# Patient Record
Sex: MALE | Race: WHITE | NOT HISPANIC OR LATINO | Employment: OTHER | URBAN - METROPOLITAN AREA
[De-identification: names, ages, dates, MRNs, and addresses within clinical notes are randomized per-mention and may not be internally consistent; named-entity substitution may affect disease eponyms.]

---

## 2023-05-23 LAB
ANION GAP IN SER/PLAS: 16 MMOL/L (ref 10–20)
CALCIUM (MG/DL) IN SER/PLAS: 10.7 MG/DL (ref 8.6–10.6)
CARBON DIOXIDE, TOTAL (MMOL/L) IN SER/PLAS: 29 MMOL/L (ref 21–32)
CHLORIDE (MMOL/L) IN SER/PLAS: 103 MMOL/L (ref 98–107)
CHOLESTEROL (MG/DL) IN SER/PLAS: 181 MG/DL (ref 0–199)
CHOLESTEROL IN HDL (MG/DL) IN SER/PLAS: 54.9 MG/DL
CHOLESTEROL/HDL RATIO: 3.3
CREATININE (MG/DL) IN SER/PLAS: 1.31 MG/DL (ref 0.5–1.3)
ESTIMATED AVERAGE GLUCOSE FOR HBA1C: 126 MG/DL
GFR MALE: 58 ML/MIN/1.73M2
GLUCOSE (MG/DL) IN SER/PLAS: 119 MG/DL (ref 74–99)
HEMOGLOBIN A1C/HEMOGLOBIN TOTAL IN BLOOD: 6 %
LDL: 85 MG/DL (ref 0–99)
NON HDL CHOLESTEROL: 126 MG/DL
POTASSIUM (MMOL/L) IN SER/PLAS: 5.6 MMOL/L (ref 3.5–5.3)
SODIUM (MMOL/L) IN SER/PLAS: 142 MMOL/L (ref 136–145)
TRIGLYCERIDE (MG/DL) IN SER/PLAS: 204 MG/DL (ref 0–149)
UREA NITROGEN (MG/DL) IN SER/PLAS: 24 MG/DL (ref 6–23)
VLDL: 41 MG/DL (ref 0–40)

## 2023-07-24 ENCOUNTER — OFFICE VISIT (OUTPATIENT)
Dept: PRIMARY CARE | Facility: CLINIC | Age: 73
End: 2023-07-24
Payer: MEDICARE

## 2023-07-24 VITALS — BODY MASS INDEX: 27.1 KG/M2 | SYSTOLIC BLOOD PRESSURE: 142 MMHG | WEIGHT: 173 LBS | DIASTOLIC BLOOD PRESSURE: 80 MMHG

## 2023-07-24 DIAGNOSIS — E11.9 TYPE 2 DIABETES MELLITUS WITHOUT COMPLICATION, WITHOUT LONG-TERM CURRENT USE OF INSULIN (MULTI): ICD-10-CM

## 2023-07-24 DIAGNOSIS — Z00.00 ROUTINE GENERAL MEDICAL EXAMINATION AT HEALTH CARE FACILITY: Primary | ICD-10-CM

## 2023-07-24 DIAGNOSIS — Z12.5 SCREENING FOR PROSTATE CANCER: ICD-10-CM

## 2023-07-24 DIAGNOSIS — I10 BENIGN ESSENTIAL HYPERTENSION: ICD-10-CM

## 2023-07-24 DIAGNOSIS — Z00.00 HEALTHCARE MAINTENANCE: ICD-10-CM

## 2023-07-24 DIAGNOSIS — Z13.1 DIABETES MELLITUS SCREENING: ICD-10-CM

## 2023-07-24 PROBLEM — G89.29 CHRONIC PAIN OF BOTH SHOULDERS: Status: ACTIVE | Noted: 2023-07-24

## 2023-07-24 PROBLEM — M25.511 CHRONIC PAIN OF BOTH SHOULDERS: Status: ACTIVE | Noted: 2023-07-24

## 2023-07-24 PROBLEM — I63.9 CVA (CEREBRAL VASCULAR ACCIDENT) (MULTI): Status: ACTIVE | Noted: 2023-07-24

## 2023-07-24 PROBLEM — M10.9 GOUT: Status: ACTIVE | Noted: 2023-07-24

## 2023-07-24 PROBLEM — I65.21 ATHEROSCLEROSIS OF RIGHT CAROTID ARTERY: Status: ACTIVE | Noted: 2023-07-24

## 2023-07-24 PROBLEM — E78.00 PURE HYPERCHOLESTEROLEMIA: Status: ACTIVE | Noted: 2023-07-24

## 2023-07-24 PROBLEM — M25.512 CHRONIC PAIN OF BOTH SHOULDERS: Status: ACTIVE | Noted: 2023-07-24

## 2023-07-24 PROCEDURE — 3077F SYST BP >= 140 MM HG: CPT | Performed by: STUDENT IN AN ORGANIZED HEALTH CARE EDUCATION/TRAINING PROGRAM

## 2023-07-24 PROCEDURE — G0439 PPPS, SUBSEQ VISIT: HCPCS | Performed by: STUDENT IN AN ORGANIZED HEALTH CARE EDUCATION/TRAINING PROGRAM

## 2023-07-24 PROCEDURE — 4010F ACE/ARB THERAPY RXD/TAKEN: CPT | Performed by: STUDENT IN AN ORGANIZED HEALTH CARE EDUCATION/TRAINING PROGRAM

## 2023-07-24 PROCEDURE — 3079F DIAST BP 80-89 MM HG: CPT | Performed by: STUDENT IN AN ORGANIZED HEALTH CARE EDUCATION/TRAINING PROGRAM

## 2023-07-24 PROCEDURE — 1170F FXNL STATUS ASSESSED: CPT | Performed by: STUDENT IN AN ORGANIZED HEALTH CARE EDUCATION/TRAINING PROGRAM

## 2023-07-24 PROCEDURE — 90732 PPSV23 VACC 2 YRS+ SUBQ/IM: CPT | Performed by: STUDENT IN AN ORGANIZED HEALTH CARE EDUCATION/TRAINING PROGRAM

## 2023-07-24 PROCEDURE — 1160F RVW MEDS BY RX/DR IN RCRD: CPT | Performed by: STUDENT IN AN ORGANIZED HEALTH CARE EDUCATION/TRAINING PROGRAM

## 2023-07-24 PROCEDURE — G0009 ADMIN PNEUMOCOCCAL VACCINE: HCPCS | Performed by: STUDENT IN AN ORGANIZED HEALTH CARE EDUCATION/TRAINING PROGRAM

## 2023-07-24 PROCEDURE — 99204 OFFICE O/P NEW MOD 45 MIN: CPT | Performed by: STUDENT IN AN ORGANIZED HEALTH CARE EDUCATION/TRAINING PROGRAM

## 2023-07-24 PROCEDURE — 3044F HG A1C LEVEL LT 7.0%: CPT | Performed by: STUDENT IN AN ORGANIZED HEALTH CARE EDUCATION/TRAINING PROGRAM

## 2023-07-24 PROCEDURE — 1036F TOBACCO NON-USER: CPT | Performed by: STUDENT IN AN ORGANIZED HEALTH CARE EDUCATION/TRAINING PROGRAM

## 2023-07-24 PROCEDURE — 1159F MED LIST DOCD IN RCRD: CPT | Performed by: STUDENT IN AN ORGANIZED HEALTH CARE EDUCATION/TRAINING PROGRAM

## 2023-07-24 PROCEDURE — 2028F FOOT EXAM PERFORMED: CPT | Performed by: STUDENT IN AN ORGANIZED HEALTH CARE EDUCATION/TRAINING PROGRAM

## 2023-07-24 RX ORDER — LISINOPRIL 40 MG/1
40 TABLET ORAL DAILY
Qty: 90 TABLET | Refills: 1 | Status: SHIPPED | OUTPATIENT
Start: 2023-07-24 | End: 2024-01-08

## 2023-07-24 RX ORDER — LISINOPRIL 20 MG/1
20 TABLET ORAL DAILY
COMMUNITY
End: 2023-07-24 | Stop reason: SDUPTHER

## 2023-07-24 RX ORDER — ATORVASTATIN CALCIUM 40 MG/1
40 TABLET, FILM COATED ORAL DAILY
COMMUNITY
End: 2024-02-08 | Stop reason: SDUPTHER

## 2023-07-24 RX ORDER — AMLODIPINE BESYLATE 5 MG/1
5 TABLET ORAL DAILY
COMMUNITY
End: 2024-02-06 | Stop reason: SDUPTHER

## 2023-07-24 RX ORDER — BLOOD-GLUCOSE METER
1 EACH MISCELLANEOUS DAILY
COMMUNITY
End: 2024-02-29 | Stop reason: SDUPTHER

## 2023-07-24 RX ORDER — NEBIVOLOL 10 MG/1
10 TABLET ORAL DAILY
COMMUNITY
Start: 2023-07-14 | End: 2024-01-15 | Stop reason: SDUPTHER

## 2023-07-24 RX ORDER — TELMISARTAN 40 MG/1
TABLET ORAL
COMMUNITY
Start: 2023-07-17 | End: 2023-07-24

## 2023-07-24 RX ORDER — INSULIN GLARGINE 300 U/ML
INJECTION, SOLUTION SUBCUTANEOUS
COMMUNITY
End: 2024-03-12 | Stop reason: SDUPTHER

## 2023-07-24 RX ORDER — GLIPIZIDE 5 MG/1
5 TABLET, FILM COATED, EXTENDED RELEASE ORAL DAILY
COMMUNITY
End: 2023-09-14 | Stop reason: SDUPTHER

## 2023-07-24 RX ORDER — METFORMIN HYDROCHLORIDE 1000 MG/1
1000 TABLET ORAL 2 TIMES DAILY
COMMUNITY
End: 2023-07-31 | Stop reason: SDUPTHER

## 2023-07-24 RX ORDER — NAPROXEN SODIUM 220 MG/1
TABLET, FILM COATED ORAL
COMMUNITY
Start: 2017-08-24

## 2023-07-24 RX ORDER — ATENOLOL 50 MG/1
50 TABLET ORAL DAILY
COMMUNITY
End: 2024-04-15 | Stop reason: ALTCHOICE

## 2023-07-24 ASSESSMENT — ACTIVITIES OF DAILY LIVING (ADL)
BATHING: INDEPENDENT
DOING_HOUSEWORK: INDEPENDENT
MANAGING_FINANCES: INDEPENDENT
TAKING_MEDICATION: INDEPENDENT
GROCERY_SHOPPING: INDEPENDENT
DRESSING: INDEPENDENT

## 2023-07-24 ASSESSMENT — ENCOUNTER SYMPTOMS
ABDOMINAL PAIN: 0
JOINT SWELLING: 0
FREQUENCY: 0
WHEEZING: 0
CHILLS: 0
SORE THROAT: 0
NECK PAIN: 0
HEADACHES: 0
VOMITING: 0
EYE PAIN: 0
PALPITATIONS: 0
DYSURIA: 0
FEVER: 0
WEAKNESS: 1
NUMBNESS: 1
SHORTNESS OF BREATH: 0

## 2023-07-24 ASSESSMENT — PATIENT HEALTH QUESTIONNAIRE - PHQ9
1. LITTLE INTEREST OR PLEASURE IN DOING THINGS: NOT AT ALL
SUM OF ALL RESPONSES TO PHQ9 QUESTIONS 1 AND 2: 0
2. FEELING DOWN, DEPRESSED OR HOPELESS: NOT AT ALL

## 2023-07-24 NOTE — PROGRESS NOTES
Subjective   Reason for Visit: Harsha Herring is an 72 y.o. male here for a Medicare Wellness visit.          Reviewed all medications by prescribing practitioner or clinical pharmacist (such as prescriptions, OTCs, herbal therapies and supplements) and documented in the medical record.    HPI    Patient Care Team:  Michael Frazier DO as PCP - General     Review of Systems    Objective   Vitals:  /80   Wt 78.5 kg (173 lb)   BMI 27.10 kg/m²       Physical Exam    Assessment/Plan   Problem List Items Addressed This Visit    None

## 2023-07-24 NOTE — PROGRESS NOTES
Subjective   Reason for Visit: Harsha Herring is an 72 y.o. male here for a Medicare Wellness visit.     Past Medical, Surgical, and Family History reviewed and updated in chart.    Reviewed all medications by prescribing practitioner or clinical pharmacist (such as prescriptions, OTCs, herbal therapies and supplements) and documented in the medical record.    Patient presents to establish PCP due to prior PCP retiring. He has no acute concerns. He occasionally has numbness of his right thumb. He mentions his blood sugars at home are typically 140-200. No hypoglycemia episodes. No falls. He eats a low carb diet and is active.     PMH: diabetes, HTN, HLD, gout, CVA with residual left sided weakness  PSH: hernia repair  SH: never smoker, drinks 6 beers three times a week, no drugs. Retired.   FH: bone cancer (mom)    Patient Care Team:  Michael Frazier DO as PCP - General     Review of Systems   Constitutional:  Negative for chills and fever.   HENT:  Negative for sore throat.    Eyes:  Negative for pain.   Respiratory:  Negative for shortness of breath and wheezing.    Cardiovascular:  Negative for chest pain and palpitations.   Gastrointestinal:  Negative for abdominal pain and vomiting.   Genitourinary:  Negative for dysuria and frequency.   Musculoskeletal:  Negative for joint swelling and neck pain.   Skin:  Negative for rash.   Neurological:  Positive for weakness (residual after prior stroke) and numbness (right thumb). Negative for headaches.   All other systems reviewed and are negative.      Objective   Vitals:  /80   Wt 78.5 kg (173 lb)   BMI 27.10 kg/m²       Physical Exam  Vitals and nursing note reviewed.   Constitutional:       General: He is not in acute distress.     Appearance: Normal appearance. He is not ill-appearing or toxic-appearing.   HENT:      Head: Normocephalic and atraumatic.      Right Ear: External ear normal.      Left Ear: External ear normal.      Mouth/Throat:       Mouth: Mucous membranes are moist.   Eyes:      Conjunctiva/sclera: Conjunctivae normal.   Cardiovascular:      Rate and Rhythm: Normal rate and regular rhythm.      Heart sounds: Normal heart sounds.   Pulmonary:      Effort: Pulmonary effort is normal.      Breath sounds: Normal breath sounds.   Abdominal:      General: There is no distension.      Palpations: Abdomen is soft.      Tenderness: There is no abdominal tenderness. There is no guarding.   Musculoskeletal:      Cervical back: Neck supple.      Right lower leg: No edema.      Left lower leg: No edema.   Skin:     General: Skin is warm and dry.   Neurological:      Mental Status: He is alert and oriented to person, place, and time.      Sensory: No sensory deficit.   Psychiatric:         Behavior: Behavior normal.         Assessment/Plan   Problem List Items Addressed This Visit       Type 2 diabetes mellitus (CMS/[facility])    Relevant Orders    Hemoglobin A1C     Other Visit Diagnoses       Routine general medical examination at health care facility    -  Primary    Diabetes mellitus screening        Relevant Orders    Hemoglobin A1C    Comprehensive Metabolic Panel    Screening for prostate cancer        Relevant Orders    Prostate Specific Antigen, Screen          #HTN  -Continue amlodipine, lisinopril, B-blocker (unsure if he is on atenolol or nebivolol). He has not received telmisartan yet, discussed he can just continue current lisinopril and not do ARB since unknown why it was going to be switched.    #HLD  -Continue atorvastatin    #T2DM  -A1c in May 2023 6%. Continue toujeo 10 units daily, glipizide, metformin. Repeat A1c next month. Consider de-escalation of regimen at next appointment.  -Recommend annual eye and foot exam.    #Hyperkalemia  #Elevated creatinine  -Noted on labs in May 2023, will repeat CMP. If persists, may need to stop lisinopril.     #History of CVA   -Continue aspirin, statin    #Health maintenance  -Reports he had negative  davi 2022.   -Pneumonia vaccine given today. Advised shingles vaccine.  -Order PSA  -Routine labs     RTC in 3-4 months    Pt seen and examined, Increase lisinopril due to elevated BP, continue other medicaitons, FU in 3-4 months

## 2023-07-27 DIAGNOSIS — I10 BENIGN ESSENTIAL HYPERTENSION: ICD-10-CM

## 2023-07-28 RX ORDER — ATORVASTATIN CALCIUM 40 MG/1
40 TABLET, FILM COATED ORAL DAILY
Refills: 0 | OUTPATIENT
Start: 2023-07-28

## 2023-07-28 RX ORDER — ATENOLOL 50 MG/1
50 TABLET ORAL DAILY
Refills: 0 | OUTPATIENT
Start: 2023-07-28

## 2023-07-28 RX ORDER — AMLODIPINE BESYLATE 5 MG/1
5 TABLET ORAL DAILY
Refills: 0 | OUTPATIENT
Start: 2023-07-28

## 2023-07-28 RX ORDER — METFORMIN HYDROCHLORIDE 1000 MG/1
1000 TABLET ORAL 2 TIMES DAILY
Refills: 0 | OUTPATIENT
Start: 2023-07-28

## 2023-07-28 RX ORDER — GLIPIZIDE 5 MG/1
5 TABLET, FILM COATED, EXTENDED RELEASE ORAL DAILY
Qty: 90 TABLET | Refills: 0 | OUTPATIENT
Start: 2023-07-28 | End: 2023-10-26

## 2023-08-01 RX ORDER — METFORMIN HYDROCHLORIDE 1000 MG/1
1000 TABLET ORAL 2 TIMES DAILY
Qty: 180 TABLET | Refills: 1 | Status: SHIPPED | OUTPATIENT
Start: 2023-08-01 | End: 2024-03-12 | Stop reason: SDUPTHER

## 2023-08-03 RX ORDER — AMLODIPINE BESYLATE 5 MG/1
5 TABLET ORAL DAILY
Qty: 90 TABLET | Refills: 1 | OUTPATIENT
Start: 2023-08-03

## 2023-08-03 RX ORDER — GLIPIZIDE 5 MG/1
5 TABLET, FILM COATED, EXTENDED RELEASE ORAL DAILY
Qty: 90 TABLET | Refills: 1 | OUTPATIENT
Start: 2023-08-03

## 2023-08-03 NOTE — TELEPHONE ENCOUNTER
Patients medications refill have been denied as patient has not been seen in office during appropriate timeframe, please call patient in order to set up appropriate follow up

## 2023-09-14 ENCOUNTER — APPOINTMENT (OUTPATIENT)
Dept: PRIMARY CARE | Facility: CLINIC | Age: 73
End: 2023-09-14
Payer: MEDICARE

## 2023-09-14 DIAGNOSIS — E11.9 TYPE 2 DIABETES MELLITUS WITHOUT COMPLICATION, UNSPECIFIED WHETHER LONG TERM INSULIN USE (MULTI): Primary | ICD-10-CM

## 2023-09-14 RX ORDER — GLIPIZIDE 5 MG/1
5 TABLET, FILM COATED, EXTENDED RELEASE ORAL DAILY
Qty: 90 TABLET | Refills: 0 | Status: SHIPPED | OUTPATIENT
Start: 2023-09-14 | End: 2023-12-13

## 2023-10-10 ENCOUNTER — LAB (OUTPATIENT)
Dept: LAB | Facility: LAB | Age: 73
End: 2023-10-10
Payer: MEDICARE

## 2023-10-10 DIAGNOSIS — E11.9 TYPE 2 DIABETES MELLITUS WITHOUT COMPLICATION, WITHOUT LONG-TERM CURRENT USE OF INSULIN (MULTI): ICD-10-CM

## 2023-10-10 DIAGNOSIS — Z12.5 SCREENING FOR PROSTATE CANCER: ICD-10-CM

## 2023-10-10 DIAGNOSIS — Z13.1 DIABETES MELLITUS SCREENING: ICD-10-CM

## 2023-10-10 LAB
ALBUMIN SERPL BCP-MCNC: 4.2 G/DL (ref 3.4–5)
ALP SERPL-CCNC: 81 U/L (ref 33–136)
ALT SERPL W P-5'-P-CCNC: 26 U/L (ref 10–52)
ANION GAP SERPL CALC-SCNC: 17 MMOL/L (ref 10–20)
AST SERPL W P-5'-P-CCNC: 15 U/L (ref 9–39)
BILIRUB SERPL-MCNC: 0.4 MG/DL (ref 0–1.2)
BUN SERPL-MCNC: 22 MG/DL (ref 6–23)
CALCIUM SERPL-MCNC: 10.3 MG/DL (ref 8.6–10.6)
CHLORIDE SERPL-SCNC: 103 MMOL/L (ref 98–107)
CO2 SERPL-SCNC: 26 MMOL/L (ref 21–32)
CREAT SERPL-MCNC: 1.48 MG/DL (ref 0.5–1.3)
EST. AVERAGE GLUCOSE BLD GHB EST-MCNC: 169 MG/DL
GFR SERPL CREATININE-BSD FRML MDRD: 50 ML/MIN/1.73M*2
GLUCOSE SERPL-MCNC: 246 MG/DL (ref 74–99)
HBA1C MFR BLD: 7.5 %
POTASSIUM SERPL-SCNC: 5.5 MMOL/L (ref 3.5–5.3)
PROT SERPL-MCNC: 6.8 G/DL (ref 6.4–8.2)
PSA SERPL-MCNC: 0.79 NG/ML
SODIUM SERPL-SCNC: 140 MMOL/L (ref 136–145)

## 2023-10-10 PROCEDURE — 36415 COLL VENOUS BLD VENIPUNCTURE: CPT

## 2023-10-10 PROCEDURE — G0103 PSA SCREENING: HCPCS

## 2023-10-10 PROCEDURE — 80053 COMPREHEN METABOLIC PANEL: CPT

## 2023-10-10 PROCEDURE — 83036 HEMOGLOBIN GLYCOSYLATED A1C: CPT

## 2023-10-31 ENCOUNTER — LAB (OUTPATIENT)
Dept: LAB | Facility: LAB | Age: 73
End: 2023-10-31
Payer: MEDICARE

## 2023-10-31 ENCOUNTER — OFFICE VISIT (OUTPATIENT)
Dept: PRIMARY CARE | Facility: CLINIC | Age: 73
End: 2023-10-31
Payer: MEDICARE

## 2023-10-31 VITALS
SYSTOLIC BLOOD PRESSURE: 128 MMHG | HEIGHT: 67 IN | DIASTOLIC BLOOD PRESSURE: 70 MMHG | HEART RATE: 64 BPM | OXYGEN SATURATION: 99 % | BODY MASS INDEX: 27.15 KG/M2 | WEIGHT: 173 LBS

## 2023-10-31 DIAGNOSIS — R73.9 HYPERGLYCEMIA: Primary | ICD-10-CM

## 2023-10-31 DIAGNOSIS — R73.9 HYPERGLYCEMIA: ICD-10-CM

## 2023-10-31 PROBLEM — I63.9 ACUTE CEREBROVASCULAR ACCIDENT (CVA) (MULTI): Status: RESOLVED | Noted: 2018-08-13 | Resolved: 2023-10-31

## 2023-10-31 PROBLEM — K40.90 INGUINAL HERNIA: Status: RESOLVED | Noted: 2023-10-31 | Resolved: 2023-10-31

## 2023-10-31 LAB
ANION GAP SERPL CALC-SCNC: 14 MMOL/L (ref 10–20)
BUN SERPL-MCNC: 28 MG/DL (ref 6–23)
CALCIUM SERPL-MCNC: 10.4 MG/DL (ref 8.6–10.6)
CHLORIDE SERPL-SCNC: 105 MMOL/L (ref 98–107)
CO2 SERPL-SCNC: 26 MMOL/L (ref 21–32)
CREAT SERPL-MCNC: 1.3 MG/DL (ref 0.5–1.3)
GFR SERPL CREATININE-BSD FRML MDRD: 58 ML/MIN/1.73M*2
GLUCOSE SERPL-MCNC: 164 MG/DL (ref 74–99)
POTASSIUM SERPL-SCNC: 4.4 MMOL/L (ref 3.5–5.3)
SODIUM SERPL-SCNC: 141 MMOL/L (ref 136–145)

## 2023-10-31 PROCEDURE — 36415 COLL VENOUS BLD VENIPUNCTURE: CPT

## 2023-10-31 PROCEDURE — 3074F SYST BP LT 130 MM HG: CPT | Performed by: STUDENT IN AN ORGANIZED HEALTH CARE EDUCATION/TRAINING PROGRAM

## 2023-10-31 PROCEDURE — 1159F MED LIST DOCD IN RCRD: CPT | Performed by: STUDENT IN AN ORGANIZED HEALTH CARE EDUCATION/TRAINING PROGRAM

## 2023-10-31 PROCEDURE — 80048 BASIC METABOLIC PNL TOTAL CA: CPT

## 2023-10-31 PROCEDURE — 4010F ACE/ARB THERAPY RXD/TAKEN: CPT | Performed by: STUDENT IN AN ORGANIZED HEALTH CARE EDUCATION/TRAINING PROGRAM

## 2023-10-31 PROCEDURE — 3051F HG A1C>EQUAL 7.0%<8.0%: CPT | Performed by: STUDENT IN AN ORGANIZED HEALTH CARE EDUCATION/TRAINING PROGRAM

## 2023-10-31 PROCEDURE — 1160F RVW MEDS BY RX/DR IN RCRD: CPT | Performed by: STUDENT IN AN ORGANIZED HEALTH CARE EDUCATION/TRAINING PROGRAM

## 2023-10-31 PROCEDURE — 3078F DIAST BP <80 MM HG: CPT | Performed by: STUDENT IN AN ORGANIZED HEALTH CARE EDUCATION/TRAINING PROGRAM

## 2023-10-31 PROCEDURE — 1036F TOBACCO NON-USER: CPT | Performed by: STUDENT IN AN ORGANIZED HEALTH CARE EDUCATION/TRAINING PROGRAM

## 2023-10-31 PROCEDURE — 2028F FOOT EXAM PERFORMED: CPT | Performed by: STUDENT IN AN ORGANIZED HEALTH CARE EDUCATION/TRAINING PROGRAM

## 2023-10-31 PROCEDURE — 99214 OFFICE O/P EST MOD 30 MIN: CPT | Performed by: STUDENT IN AN ORGANIZED HEALTH CARE EDUCATION/TRAINING PROGRAM

## 2023-10-31 ASSESSMENT — ENCOUNTER SYMPTOMS
SHORTNESS OF BREATH: 0
JOINT SWELLING: 0
DYSURIA: 0
FREQUENCY: 0
PALPITATIONS: 0
CHILLS: 0
HEADACHES: 0
WEAKNESS: 1
ABDOMINAL PAIN: 0
NUMBNESS: 1
NECK PAIN: 0
VOMITING: 0
WHEEZING: 0
EYE PAIN: 0
FEVER: 0
SORE THROAT: 0

## 2023-10-31 NOTE — PROGRESS NOTES
"Subjective   Reason for Visit: Harsha Herring is an 73 y.o. male here for a Medicare Wellness visit.     Past Medical, Surgical, and Family History reviewed and updated in chart.    Reviewed all medications by prescribing practitioner or clinical pharmacist (such as prescriptions, OTCs, herbal therapies and supplements) and documented in the medical record.    Patient presents as a follow-up. No acute concerns. States that his sugars are in the 120s-140s at home. No hypoglycemic episodes. No falls. Still eats a low carb diet and is active. No dizziness/LH/headaches. Does not take his BP at home.    Patient Care Team:  Michael Frazier DO as PCP - General  Ihsan Villarreal MD as PCP - [facility]     Review of Systems   Constitutional:  Negative for chills and fever.   HENT:  Negative for sore throat.    Eyes:  Negative for pain.   Respiratory:  Negative for shortness of breath and wheezing.    Cardiovascular:  Negative for chest pain and palpitations.   Gastrointestinal:  Negative for abdominal pain and vomiting.   Genitourinary:  Negative for dysuria and frequency.   Musculoskeletal:  Negative for joint swelling and neck pain.   Skin:  Negative for rash.   Neurological:  Positive for weakness (residual after prior stroke) and numbness (right thumb). Negative for headaches.   All other systems reviewed and are negative.      Objective   Vitals:  /70   Pulse 64   Ht 1.702 m (5' 7\")   Wt 78.5 kg (173 lb)   SpO2 99%   BMI 27.10 kg/m²       Physical Exam  Vitals and nursing note reviewed.   Constitutional:       General: He is not in acute distress.     Appearance: Normal appearance. He is not ill-appearing or toxic-appearing.   HENT:      Head: Normocephalic and atraumatic.      Right Ear: External ear normal.      Left Ear: External ear normal.      Mouth/Throat:      Mouth: Mucous membranes are moist.   Eyes:      Conjunctiva/sclera: Conjunctivae normal.   Cardiovascular:      Rate and " Rhythm: Normal rate and regular rhythm.      Heart sounds: Normal heart sounds.   Pulmonary:      Effort: Pulmonary effort is normal.      Breath sounds: Normal breath sounds.   Abdominal:      General: There is no distension.      Palpations: Abdomen is soft.      Tenderness: There is no abdominal tenderness. There is no guarding.   Musculoskeletal:      Cervical back: Neck supple.      Right lower leg: No edema.      Left lower leg: No edema.   Skin:     General: Skin is warm and dry.   Neurological:      Mental Status: He is alert and oriented to person, place, and time.      Sensory: No sensory deficit.   Psychiatric:         Behavior: Behavior normal.         Assessment/Plan   Problem List Items Addressed This Visit    None  Visit Diagnoses       Hyperglycemia    -  Primary    Relevant Orders    Basic metabolic panel          #HTN  ::BP on retake improved  -Continue amlodipine, lisinopril, B-blocker.    #HLD  -Continue atorvastatin    #T2DM  -A1c in October 2023 7.5%, in May 2023 6%. Continue toujeo 10 units daily, glipizide, metformin.   -ordered BMP, if glucose still elevated will increase glipizide  -Recommend annual eye and foot exam.    #Hyperkalemia  #Elevated creatinine  -K+ 5.5 Cr 1.48: if persists, may need to stop lisinopril/start a binder    #History of CVA   -Continue aspirin, statin    #Health maintenance  ::PSA 10/10/23: .79   -Reports negative cologuard 2022.   -Up to date on pneumonia vaccine  -does not want flu/covid/shingrex at this time    RTC in 3-4 months    Luz Velazquez MD  PGY-1 Internal Medicine  Staffed with the attending physician Dr. Frazier.         Patient seen with resident physician, recheck of blood pressure noted to be within goal.  Encourage diet and lifestyle modifications.  He reports that his blood sugars have been much more controlled at home.  A1c was elevated though.  Recheck BMP today to follow-up on potassium as well as blood sugars.  If continues to be elevated,  will increase glipizide to 10 mg.  Advised follow-up in 3 to 4 months

## 2024-02-06 ENCOUNTER — LAB (OUTPATIENT)
Dept: LAB | Facility: LAB | Age: 74
End: 2024-02-06
Payer: COMMERCIAL

## 2024-02-06 ENCOUNTER — OFFICE VISIT (OUTPATIENT)
Dept: PRIMARY CARE | Facility: CLINIC | Age: 74
End: 2024-02-06
Payer: COMMERCIAL

## 2024-02-06 VITALS
BODY MASS INDEX: 27 KG/M2 | DIASTOLIC BLOOD PRESSURE: 80 MMHG | HEIGHT: 67 IN | SYSTOLIC BLOOD PRESSURE: 150 MMHG | WEIGHT: 172 LBS

## 2024-02-06 DIAGNOSIS — Z13.1 DIABETES MELLITUS SCREENING: ICD-10-CM

## 2024-02-06 DIAGNOSIS — E11.9 TYPE 2 DIABETES MELLITUS WITHOUT COMPLICATION, WITHOUT LONG-TERM CURRENT USE OF INSULIN (MULTI): ICD-10-CM

## 2024-02-06 DIAGNOSIS — E08.22 DIABETES MELLITUS DUE TO UNDERLYING CONDITION WITH CHRONIC KIDNEY DISEASE, WITHOUT LONG-TERM CURRENT USE OF INSULIN, UNSPECIFIED CKD STAGE (MULTI): ICD-10-CM

## 2024-02-06 DIAGNOSIS — Z00.00 ROUTINE GENERAL MEDICAL EXAMINATION AT HEALTH CARE FACILITY: Primary | ICD-10-CM

## 2024-02-06 DIAGNOSIS — I63.12: ICD-10-CM

## 2024-02-06 DIAGNOSIS — Z13.6 SCREENING FOR CARDIOVASCULAR CONDITION: ICD-10-CM

## 2024-02-06 DIAGNOSIS — I63.9 CEREBROVASCULAR ACCIDENT (CVA), UNSPECIFIED MECHANISM (MULTI): ICD-10-CM

## 2024-02-06 DIAGNOSIS — I10 BENIGN ESSENTIAL HYPERTENSION: ICD-10-CM

## 2024-02-06 DIAGNOSIS — R93.89 ABNORMAL CAROTID ULTRASOUND: ICD-10-CM

## 2024-02-06 LAB
ALBUMIN SERPL BCP-MCNC: 4.3 G/DL (ref 3.4–5)
ALP SERPL-CCNC: 79 U/L (ref 33–136)
ALT SERPL W P-5'-P-CCNC: 15 U/L (ref 10–52)
ANION GAP SERPL CALC-SCNC: 16 MMOL/L (ref 10–20)
AST SERPL W P-5'-P-CCNC: 13 U/L (ref 9–39)
BILIRUB SERPL-MCNC: 0.5 MG/DL (ref 0–1.2)
BUN SERPL-MCNC: 26 MG/DL (ref 6–23)
CALCIUM SERPL-MCNC: 10.1 MG/DL (ref 8.6–10.6)
CHLORIDE SERPL-SCNC: 102 MMOL/L (ref 98–107)
CHOLEST SERPL-MCNC: 278 MG/DL (ref 0–199)
CHOLESTEROL/HDL RATIO: 5.7
CO2 SERPL-SCNC: 28 MMOL/L (ref 21–32)
CREAT SERPL-MCNC: 1.32 MG/DL (ref 0.5–1.3)
CREAT UR-MCNC: 74.4 MG/DL (ref 20–370)
EGFRCR SERPLBLD CKD-EPI 2021: 57 ML/MIN/1.73M*2
EST. AVERAGE GLUCOSE BLD GHB EST-MCNC: 134 MG/DL
GLUCOSE SERPL-MCNC: 150 MG/DL (ref 74–99)
HBA1C MFR BLD: 6.3 %
HDLC SERPL-MCNC: 49.2 MG/DL
LDLC SERPL CALC-MCNC: 172 MG/DL
MICROALBUMIN UR-MCNC: 1165.4 MG/L
MICROALBUMIN/CREAT UR: 1566.4 UG/MG CREAT
NON HDL CHOLESTEROL: 229 MG/DL (ref 0–149)
POTASSIUM SERPL-SCNC: 4.5 MMOL/L (ref 3.5–5.3)
PROT SERPL-MCNC: 6.7 G/DL (ref 6.4–8.2)
SODIUM SERPL-SCNC: 141 MMOL/L (ref 136–145)
TRIGL SERPL-MCNC: 282 MG/DL (ref 0–149)
VLDL: 56 MG/DL (ref 0–40)

## 2024-02-06 PROCEDURE — 99214 OFFICE O/P EST MOD 30 MIN: CPT | Performed by: STUDENT IN AN ORGANIZED HEALTH CARE EDUCATION/TRAINING PROGRAM

## 2024-02-06 PROCEDURE — 1036F TOBACCO NON-USER: CPT | Performed by: STUDENT IN AN ORGANIZED HEALTH CARE EDUCATION/TRAINING PROGRAM

## 2024-02-06 PROCEDURE — 4010F ACE/ARB THERAPY RXD/TAKEN: CPT | Performed by: STUDENT IN AN ORGANIZED HEALTH CARE EDUCATION/TRAINING PROGRAM

## 2024-02-06 PROCEDURE — 3079F DIAST BP 80-89 MM HG: CPT | Performed by: STUDENT IN AN ORGANIZED HEALTH CARE EDUCATION/TRAINING PROGRAM

## 2024-02-06 PROCEDURE — 82043 UR ALBUMIN QUANTITATIVE: CPT

## 2024-02-06 PROCEDURE — G0446 INTENS BEHAVE THER CARDIO DX: HCPCS | Performed by: STUDENT IN AN ORGANIZED HEALTH CARE EDUCATION/TRAINING PROGRAM

## 2024-02-06 PROCEDURE — 1159F MED LIST DOCD IN RCRD: CPT | Performed by: STUDENT IN AN ORGANIZED HEALTH CARE EDUCATION/TRAINING PROGRAM

## 2024-02-06 PROCEDURE — 80061 LIPID PANEL: CPT

## 2024-02-06 PROCEDURE — 3077F SYST BP >= 140 MM HG: CPT | Performed by: STUDENT IN AN ORGANIZED HEALTH CARE EDUCATION/TRAINING PROGRAM

## 2024-02-06 PROCEDURE — 36415 COLL VENOUS BLD VENIPUNCTURE: CPT

## 2024-02-06 PROCEDURE — 1160F RVW MEDS BY RX/DR IN RCRD: CPT | Performed by: STUDENT IN AN ORGANIZED HEALTH CARE EDUCATION/TRAINING PROGRAM

## 2024-02-06 PROCEDURE — 82570 ASSAY OF URINE CREATININE: CPT

## 2024-02-06 PROCEDURE — 1170F FXNL STATUS ASSESSED: CPT | Performed by: STUDENT IN AN ORGANIZED HEALTH CARE EDUCATION/TRAINING PROGRAM

## 2024-02-06 PROCEDURE — 80053 COMPREHEN METABOLIC PANEL: CPT

## 2024-02-06 PROCEDURE — 83036 HEMOGLOBIN GLYCOSYLATED A1C: CPT

## 2024-02-06 PROCEDURE — G0444 DEPRESSION SCREEN ANNUAL: HCPCS | Performed by: STUDENT IN AN ORGANIZED HEALTH CARE EDUCATION/TRAINING PROGRAM

## 2024-02-06 PROCEDURE — G0439 PPPS, SUBSEQ VISIT: HCPCS | Performed by: STUDENT IN AN ORGANIZED HEALTH CARE EDUCATION/TRAINING PROGRAM

## 2024-02-06 RX ORDER — LISINOPRIL 40 MG/1
40 TABLET ORAL DAILY
Qty: 90 TABLET | Refills: 1 | Status: SHIPPED | OUTPATIENT
Start: 2024-02-06 | End: 2024-08-04

## 2024-02-06 RX ORDER — NEBIVOLOL 20 MG/1
20 TABLET ORAL DAILY
Qty: 90 TABLET | Refills: 1 | Status: SHIPPED | OUTPATIENT
Start: 2024-02-06 | End: 2024-08-04

## 2024-02-06 RX ORDER — AMLODIPINE BESYLATE 5 MG/1
5 TABLET ORAL DAILY
Qty: 90 TABLET | Refills: 1 | Status: SHIPPED | OUTPATIENT
Start: 2024-02-06 | End: 2024-05-07 | Stop reason: SDUPTHER

## 2024-02-06 ASSESSMENT — ENCOUNTER SYMPTOMS
DIZZINESS: 0
NERVOUS/ANXIOUS: 0
FATIGUE: 1
BLACKOUTS: 0
HEADACHES: 0
CONFUSION: 0
POLYDIPSIA: 0
HUNGER: 0
VISUAL CHANGE: 0
BLURRED VISION: 0
CARDIOVASCULAR NEGATIVE: 1
RESPIRATORY NEGATIVE: 1
TREMORS: 0
PSYCHIATRIC NEGATIVE: 1
OCCASIONAL FEELINGS OF UNSTEADINESS: 0
POLYPHAGIA: 0
SWEATS: 0
SEIZURES: 0
WEIGHT LOSS: 0
MUSCULOSKELETAL NEGATIVE: 1
NEUROLOGICAL NEGATIVE: 1
GASTROINTESTINAL NEGATIVE: 1
LOSS OF SENSATION IN FEET: 0
SPEECH DIFFICULTY: 0
DEPRESSION: 0
WEAKNESS: 0

## 2024-02-06 ASSESSMENT — ACTIVITIES OF DAILY LIVING (ADL)
GROCERY_SHOPPING: INDEPENDENT
TAKING_MEDICATION: INDEPENDENT
DOING_HOUSEWORK: INDEPENDENT
BATHING: INDEPENDENT
DRESSING: INDEPENDENT
MANAGING_FINANCES: INDEPENDENT

## 2024-02-06 NOTE — PROGRESS NOTES
Subjective   Patient ID: Harsha Herring is a 73 y.o. male who presents for Follow-up (For diabetes), Med Refill (Lisinopril; Test strips; Toujeo), and Medicare Annual Wellness Visit Subsequent.  HPI  Patient is here today for follow up. Reports feeling well and has no new complaints. He reports his SBP ranges around 140-150 and this morning it was about 203 SBP. Reports his sugars are well controlled in the same range as before.     Review of Systems  12-point ROS was reviewed and is negative, unless otherwise noted in HPI    Objective   Vitals:    02/06/24 0953   BP: 150/80      Physical Exam  GEN: alert, conversant, NAD  HEENT: PERRL, EOMI, MMM  NECK: supple, no LAD appreciated  CHEST: CTAB  CV: S1, S2, RRR, no murmurs appreciated  ABD: soft, NT, ND  EXT: no significant LE edema  SKIN: warm, dry    Assessment/Plan   #HTN  ::BP home readings remain high  -Continue Lisinopril, Bystolic  -Begin amlodipine 5 mg.      #HLD  -Continue atorvastatin     #T2DM  -A1c in October 2023 7.5%, in May 2023 6%. Continue toujeo 10 units daily, glipizide, metformin.   -Recommend annual eye and foot exam.     #Hyperkalemia  #Elevated creatinine  -K+ 5.5 Cr 1.48: if persists, may need to stop lisinopril/start a binder     #History of CVA   -Continue aspirin, statin     #Health maintenance  ::PSA 10/10/23: .79   -Reports negative cologuard 2022.   -Up to date on pneumonia vaccine  -does not want flu/covid/shingrex at this time  -Labs ordered today  -Wellness visit today     RTC in 3-4 months       Violeta Nunes DO

## 2024-02-06 NOTE — PROGRESS NOTES
"Subjective   Patient ID: Harsha Herring is a 73 y.o. male who presents for Follow-up (For diabetes) and Med Refill (Lisinopril; Test strips; Toujeo).    HPI     Review of Systems    Objective   Ht 1.702 m (5' 7\")   Wt 78 kg (172 lb)   BMI 26.94 kg/m²     Physical Exam    Assessment/Plan          "

## 2024-02-06 NOTE — PROGRESS NOTES
Subjective   Reason for Visit: Harsha Herring is an 73 y.o. male here for a Medicare Wellness visit.               Patient is here today for follow up. Reports feeling well and has no new complaints. He reports his SBP ranges around 140-150 and this morning it was about 203 SBP. Reports his sugars are well controlled in the same range as before.      Review of Systems  12-point ROS was reviewed and is negative, unless otherwise noted in HPI      Diabetes  He has type 2 diabetes mellitus. No MedicAlert identification noted. The initial diagnosis of diabetes was made 30 years ago. Pertinent negatives for hypoglycemia include no confusion, dizziness, headaches, hunger, mood changes, nervousness/anxiousness, pallor, seizures, sleepiness, speech difficulty, sweats or tremors. Associated symptoms include fatigue and foot paresthesias. Pertinent negatives for diabetes include no blurred vision, no chest pain, no foot ulcerations, no polydipsia, no polyphagia, no polyuria, no visual change, no weakness and no weight loss. Pertinent negatives for hypoglycemia complications include no blackouts, no hospitalization, no nocturnal hypoglycemia, no required assistance and no required glucagon injection. Symptoms are stable. Diabetic complications include impotence. Pertinent negatives for diabetic complications include no CVA, heart disease, nephropathy, peripheral neuropathy, PVD or retinopathy. Risk factors for coronary artery disease include hypertension. Current diabetic treatment includes insulin injections and oral agent (dual therapy). He is compliant with treatment all of the time. He is currently taking insulin pre-lunch. Insulin injections are given by patient. Rotation sites for injection include the abdominal wall. He is following a generally unhealthy and low fat/cholesterol diet. Meal planning includes avoidance of concentrated sweets and carbohydrate counting. He has not had a previous visit with a dietitian. He  "rarely participates in exercise. He monitors blood glucose at home 3-4 x per week. He monitors urine at home <1 x per month. Blood glucose monitoring compliance is good. His home blood glucose trend is fluctuating minimally. He does not see a podiatrist.Eye exam is current.       Patient Care Team:  Michael Frazier DO as PCP - General  Michael Frazier DO as PCP - [facility] [employer] PCP     Review of Systems   Constitutional:  Positive for fatigue. Negative for weight loss.   HENT: Negative.     Eyes:  Negative for blurred vision.   Respiratory: Negative.     Cardiovascular: Negative.  Negative for chest pain.   Gastrointestinal: Negative.    Endocrine: Negative for polydipsia, polyphagia and polyuria.   Genitourinary:  Positive for impotence.   Musculoskeletal: Negative.    Skin: Negative.  Negative for pallor.   Neurological: Negative.  Negative for dizziness, tremors, seizures, speech difficulty, weakness and headaches.   Psychiatric/Behavioral: Negative.  Negative for confusion. The patient is not nervous/anxious.        Objective   Vitals:  /80   Ht 1.702 m (5' 7\")   Wt 78 kg (172 lb)   BMI 26.94 kg/m²       Physical Exam  Constitutional:       General: He is not in acute distress.     Appearance: He is not ill-appearing.   Eyes:      Pupils: Pupils are equal, round, and reactive to light.   Cardiovascular:      Rate and Rhythm: Normal rate and regular rhythm.      Pulses: Normal pulses.      Heart sounds: No murmur heard.  Pulmonary:      Effort: No respiratory distress.      Breath sounds: No wheezing.   Abdominal:      General: Abdomen is flat. Bowel sounds are normal. There is no distension.   Musculoskeletal:      Right lower leg: No edema.      Left lower leg: No edema.      Comments: Residual left sided weakness   Skin:     General: Skin is warm and dry.   Neurological:      Mental Status: He is alert. Mental status is at baseline.      Cranial Nerves: No cranial nerve " deficit.      Motor: No weakness.   Psychiatric:         Mood and Affect: Mood normal.         Behavior: Behavior normal.         Assessment/Plan   Problem List Items Addressed This Visit       Type 2 diabetes mellitus (CMS/[facility])    Relevant Orders    Albumin, urine, random    Diabetes mellitus screening    Relevant Orders    Hemoglobin A1C    Comprehensive Metabolic Panel    Routine general medical examination at health care facility - Primary     Other Visit Diagnoses       Diabetes mellitus due to underlying condition with chronic kidney disease, without long-term current use of insulin, unspecified CKD stage (CMS/[facility])        Relevant Orders    Hemoglobin A1C    Screening for cardiovascular condition        Relevant Orders    Lipid panel             #HTN  ::BP home readings remain high  -Continue Lisinopril, Bystolic increase discussed side effect profile  -Begin amlodipine 5 mg.      #HLD  -Continue atorvastatin     #T2DM  -A1c in October 2023 7.5%, in May 2023 6%. Continue toujeo 10 units daily, glipizide, metformin. Seems well controlled recheck CMP and A1c today  -Recommend annual eye and foot exam.     #Hyperkalemia  #Elevated creatinine  -K+ 5.5 Cr 1.48: if persists, may need to stop lisinopril/start a binder, recheck today     #History of CVA   -Continue aspirin, statin    #hx of carotid stenosis  With known CVA is high risk, recommend repeat carotid ultrasound     #Health maintenance  ::PSA 10/10/23: .79   -Reports negative cologuard 2022.   -Up to date on pneumonia vaccine  -does not want flu/covid/shingrex at this time  -Labs ordered today  -Wellness visit today     RTC in 3-5 months     Violeta Nunes, DO       Patient seen in conjunction with resident physician, Medicare wellness today up-to-date on preventative vaccinations and screenings, recommend carotid ultrasound discussed lifestyle modifications.  Optimize blood pressure.  He will closely monitor this.  Discussed importance of optimizing the  ASCVD risk score and diabetic risk factors during wellness exam.

## 2024-02-08 RX ORDER — ATORVASTATIN CALCIUM 80 MG/1
80 TABLET, FILM COATED ORAL DAILY
Qty: 90 TABLET | Refills: 0 | Status: SHIPPED | OUTPATIENT
Start: 2024-02-08 | End: 2024-04-26

## 2024-02-27 ENCOUNTER — HOSPITAL ENCOUNTER (OUTPATIENT)
Dept: VASCULAR MEDICINE | Facility: CLINIC | Age: 74
Discharge: HOME | End: 2024-02-27
Payer: COMMERCIAL

## 2024-02-27 DIAGNOSIS — R93.89 ABNORMAL CAROTID ULTRASOUND: ICD-10-CM

## 2024-02-27 DIAGNOSIS — R09.89 OTHER SPECIFIED SYMPTOMS AND SIGNS INVOLVING THE CIRCULATORY AND RESPIRATORY SYSTEMS: ICD-10-CM

## 2024-02-27 DIAGNOSIS — I63.12: ICD-10-CM

## 2024-02-27 PROCEDURE — 93880 EXTRACRANIAL BILAT STUDY: CPT | Performed by: INTERNAL MEDICINE

## 2024-02-27 PROCEDURE — 93880 EXTRACRANIAL BILAT STUDY: CPT

## 2024-02-28 DIAGNOSIS — I65.21 STENOSIS OF RIGHT CAROTID ARTERY: Primary | ICD-10-CM

## 2024-03-12 DIAGNOSIS — I10 BENIGN ESSENTIAL HYPERTENSION: ICD-10-CM

## 2024-03-12 DIAGNOSIS — E11.9 TYPE 2 DIABETES MELLITUS WITHOUT COMPLICATION, WITHOUT LONG-TERM CURRENT USE OF INSULIN (MULTI): Primary | ICD-10-CM

## 2024-03-15 RX ORDER — INSULIN GLARGINE 300 U/ML
INJECTION, SOLUTION SUBCUTANEOUS
Qty: 1.5 ML | Refills: 1 | Status: SHIPPED | OUTPATIENT
Start: 2024-03-15

## 2024-03-15 RX ORDER — METFORMIN HYDROCHLORIDE 1000 MG/1
1000 TABLET ORAL 2 TIMES DAILY
Qty: 180 TABLET | Refills: 1 | Status: SHIPPED | OUTPATIENT
Start: 2024-03-15

## 2024-03-20 DIAGNOSIS — E11.9 TYPE 2 DIABETES MELLITUS WITHOUT COMPLICATION, UNSPECIFIED WHETHER LONG TERM INSULIN USE (MULTI): ICD-10-CM

## 2024-03-20 RX ORDER — GLIPIZIDE 5 MG/1
TABLET, FILM COATED, EXTENDED RELEASE ORAL
Qty: 90 TABLET | Refills: 0 | Status: SHIPPED | OUTPATIENT
Start: 2024-03-20

## 2024-03-26 ENCOUNTER — APPOINTMENT (OUTPATIENT)
Dept: VASCULAR SURGERY | Facility: CLINIC | Age: 74
End: 2024-03-26
Payer: COMMERCIAL

## 2024-03-27 ENCOUNTER — OFFICE VISIT (OUTPATIENT)
Dept: VASCULAR SURGERY | Facility: CLINIC | Age: 74
End: 2024-03-27
Payer: COMMERCIAL

## 2024-03-27 ENCOUNTER — LAB (OUTPATIENT)
Dept: LAB | Facility: LAB | Age: 74
End: 2024-03-27
Payer: COMMERCIAL

## 2024-03-27 VITALS
BODY MASS INDEX: 28.25 KG/M2 | SYSTOLIC BLOOD PRESSURE: 134 MMHG | HEIGHT: 67 IN | OXYGEN SATURATION: 96 % | HEART RATE: 64 BPM | DIASTOLIC BLOOD PRESSURE: 80 MMHG | WEIGHT: 180 LBS

## 2024-03-27 DIAGNOSIS — I65.21 STENOSIS OF RIGHT CAROTID ARTERY: ICD-10-CM

## 2024-03-27 LAB
ANION GAP SERPL CALC-SCNC: 18 MMOL/L (ref 10–20)
BUN SERPL-MCNC: 24 MG/DL (ref 6–23)
CALCIUM SERPL-MCNC: 10 MG/DL (ref 8.6–10.3)
CHLORIDE SERPL-SCNC: 102 MMOL/L (ref 98–107)
CO2 SERPL-SCNC: 24 MMOL/L (ref 21–32)
CREAT SERPL-MCNC: 1.34 MG/DL (ref 0.5–1.3)
EGFRCR SERPLBLD CKD-EPI 2021: 56 ML/MIN/1.73M*2
GLUCOSE SERPL-MCNC: 139 MG/DL (ref 74–99)
POTASSIUM SERPL-SCNC: 5.2 MMOL/L (ref 3.5–5.3)
SODIUM SERPL-SCNC: 139 MMOL/L (ref 136–145)

## 2024-03-27 PROCEDURE — 3050F LDL-C >= 130 MG/DL: CPT | Performed by: SURGERY

## 2024-03-27 PROCEDURE — 4010F ACE/ARB THERAPY RXD/TAKEN: CPT | Performed by: SURGERY

## 2024-03-27 PROCEDURE — 3062F POS MACROALBUMINURIA REV: CPT | Performed by: SURGERY

## 2024-03-27 PROCEDURE — 1036F TOBACCO NON-USER: CPT | Performed by: SURGERY

## 2024-03-27 PROCEDURE — 1160F RVW MEDS BY RX/DR IN RCRD: CPT | Performed by: SURGERY

## 2024-03-27 PROCEDURE — 3044F HG A1C LEVEL LT 7.0%: CPT | Performed by: SURGERY

## 2024-03-27 PROCEDURE — 99204 OFFICE O/P NEW MOD 45 MIN: CPT | Performed by: SURGERY

## 2024-03-27 PROCEDURE — 3075F SYST BP GE 130 - 139MM HG: CPT | Performed by: SURGERY

## 2024-03-27 PROCEDURE — 80048 BASIC METABOLIC PNL TOTAL CA: CPT

## 2024-03-27 PROCEDURE — 36415 COLL VENOUS BLD VENIPUNCTURE: CPT

## 2024-03-27 PROCEDURE — 3079F DIAST BP 80-89 MM HG: CPT | Performed by: SURGERY

## 2024-03-27 PROCEDURE — 1159F MED LIST DOCD IN RCRD: CPT | Performed by: SURGERY

## 2024-03-28 NOTE — PROGRESS NOTES
"Harsha Herring is a 73 y.o. male     Subjective   This patient presents today as new consult for evaluation of carotid artery disease.  He is currently 73 years old.  He did have a right hemispheric stroke back in August 2018.  He has recovered relatively well.  He is able to ambulate.  He does have some dysfunction of his left upper extremity.  He currently denies any recent symptoms associated with his carotid artery disease.  He is 5 foot 7 and weighs 180 pounds.  He has never smoked.  He is a diabetic and he does have a history of hypertension.  He has no known drug allergies.  He currently denies any fever chills nausea vomiting or headache         Objective     Vitals:    03/27/24 1100   BP: 134/80   Pulse: 64   SpO2: 96%      Physical Exam  This patient is alert and oriented x 3.  He is in no acute distress.  Head is normocephalic.  Pupils are equal and reactive to light and accommodation.  Neck is soft and supple without palpable lymph nodes.  Heart is regular rate.  Lungs are clear.  Abdomen soft with positive bowel sounds there is no pain on palpation.  Right upper and right lower extremities have adequate range of motion.  His left upper extremity has decreased range of motion and some decreased  strength.  He also has some weakness involving his left leg.  Skin turgor is adequate.  Psychologically seems to be acting appropriately.  Blood pressure 134/80, pulse 64, height 1.702 m (5' 7\"), weight 81.6 kg (180 lb), SpO2 96 %.            Patient Active Problem List    Diagnosis Date Noted    Atherosclerosis of right carotid artery 07/24/2023    Chronic pain of both shoulders 07/24/2023    Benign essential hypertension 07/24/2023    CVA (cerebral vascular accident) (CMS/HCC) 07/24/2023    Gout 07/24/2023    Type 2 diabetes mellitus (CMS/[facility]) 07/24/2023    Pure hypercholesterolemia 07/24/2023    Diabetes mellitus screening 07/24/2023    Screening for prostate cancer 07/24/2023    Routine general medical " examination at health care facility 07/24/2023          Current Outpatient Medications:     amLODIPine (Norvasc) 5 mg tablet, Take 1 tablet (5 mg) by mouth once daily., Disp: 90 tablet, Rfl: 1    aspirin 81 mg chewable tablet, Chew once daily., Disp: , Rfl:     atenolol (Tenormin) 50 mg tablet, Take 1 tablet (50 mg) by mouth once daily., Disp: , Rfl:     atorvastatin (Lipitor) 80 mg tablet, Take 1 tablet (80 mg) by mouth once daily., Disp: 90 tablet, Rfl: 0    blood sugar diagnostic (OneTouch Verio test strips) strip, 1 strip by subdermal route once daily., Disp: 90 strip, Rfl: 0    glipiZIDE XL (Glucotrol XL) 5 mg 24 hr tablet, TAKE 1 TABLET DAILY (NEED APPOINTMENT FOR FURTHER REFILLS. CALL TODAY FOR APPOINTMENT), Disp: 90 tablet, Rfl: 0    lisinopril 40 mg tablet, Take 1 tablet (40 mg) by mouth once daily., Disp: 90 tablet, Rfl: 1    metFORMIN (Glucophage) 1,000 mg tablet, Take 1 tablet (1,000 mg) by mouth 2 times a day., Disp: 180 tablet, Rfl: 1    nebivolol (Bystolic) 20 mg tablet, Take 1 tablet (20 mg) by mouth once daily., Disp: 90 tablet, Rfl: 1    Toujeo SoloStar U-300 Insulin 300 unit/mL (1.5 mL) injection, INJECT 15 UNITS SUBCUTANEOUSLY DAILY, Disp: 1.5 mL, Rfl: 1     Lab Results   Component Value Date    WBC 4.9 07/10/2019    HGB 13.9 07/10/2019    HCT 41.3 07/10/2019     07/10/2019    CHOL 278 (H) 02/06/2024    TRIG 282 (H) 02/06/2024    HDL 49.2 02/06/2024    ALT 15 02/06/2024    AST 13 02/06/2024     03/27/2024    K 5.2 03/27/2024     03/27/2024    CREATININE 1.34 (H) 03/27/2024    BUN 24 (H) 03/27/2024    CO2 24 03/27/2024    PSA 0.61 07/10/2019    HGBA1C 6.3 (H) 02/06/2024       Vascular US carotid artery duplex bilateral    Result Date: 2/29/2024           [facility], Vascular Lab [address], [place] [address]             Tel [phone] and Fax [phone]  Vascular Lab Report [facility] US CAROTID ARTERY DUPLEX BILATERAL  Patient  Name:      LAI Nunez Physician: [id number] Nathalie Altman MD Study Date:        2/27/2024           Ordering           [id number] JOSE TAYLOR                                        Physician:         BO MRN/PID:           [id number]            Technologist:      Austin Tang                                                           T Accession#:        [id number]        Technologist 2: Date of Birth/Age: 1950 / 73      Encounter#:        [id number]                    years Gender:            M Admission Status:  Outpatient          Location           [facility]                                        Performed:  Diagnosis/ICD: Other specified symptoms and signs involving the circulatory and                respiratory systems-R09.89 CPT Codes:     83081 Cerebrovascular Carotid Duplex scan complete  **CRITICAL RESULT** Critical Result: >70% right ICA senosis. Notification called to Dr Frazier on 2/27/2024 at 2:50:15 PM by shy.  CONCLUSIONS: Right Carotid: Findings are consistent with greater than 70% stenosis of the right proximal internal carotid artery. The right vertebral artery is patent with antegrade flow. No evidence of hemodynamically significant stenosis in the right subclavian artery. Left Carotid: Findings are consistent with less than 50% stenosis of the left proximal internal carotid artery. The left vertebral artery demonstrates no flow. No evidence of hemodynamically significant stenosis in the left subclavian artery.  Imaging & Doppler Findings: Right Plaque Morph: The proximal right internal carotid artery demonstrates calcified plaque. Left Plaque Morph: The proximal left internal carotid artery demonstrates heterogenous plaque.   Right                        Left   PSV      EDV                PSV      EDV 59 cm/s            CCA P    94 cm/s 57 cm/s            CCA D    72 cm/s 326 cm/s 59 cm/s   ICA P    59 cm/s  10 cm/s 57 cm/s  11 cm/s   ICA M    60 cm/s  15  cm/s 81 cm/s  20 cm/s   ICA D    60 cm/s  17 cm/s 78 cm/s             ECA     95 cm/s 52 cm/s  10 cm/s Vertebral 147 cm/s         Subclavian 126 cm/s               Right Left ICA/CCA Ratio  5.7  0.8   [id number] Nathalie Altman MD Electronically signed by [id number] Nathalie Altman MD on 2/29/2024 at 12:49:41 PM  ** Final **                 Assessment/Plan   Active Problems:  There are no active Hospital Problems.      This patient presents today as a new consult for evaluation of carotid artery disease.  He currently denies any recent symptoms associated with his carotid artery disease.  He did have a fairly significant right hemispheric stroke back in August 2018.  He has recovered relatively well.  His left upper extremity does have some weakness along with his left lower extremity.  He is still relatively functional.  He did recently have a carotid duplex scan and I have extensively gone over the results with him.  This shows a peak systolic velocity of 326 cm/s with end-diastolic velocity of 59 cm/s on the right.  His ratio is 5.7.  On the left his velocities and ratio are normal.  At this time, I would like to order a CTA of his head and neck for further evaluation of his right carotid artery disease.  I do feel he is a candidate for right carotid endarterectomy if the CAT scan shows significant disease.  Thank you very much for allow me to take part in the care of your patient.  Sincerely years, Dr. Spenser Moreno, DO

## 2024-04-09 ENCOUNTER — HOSPITAL ENCOUNTER (OUTPATIENT)
Dept: RADIOLOGY | Facility: HOSPITAL | Age: 74
Discharge: HOME | End: 2024-04-09
Payer: COMMERCIAL

## 2024-04-09 DIAGNOSIS — I65.21 STENOSIS OF RIGHT CAROTID ARTERY: ICD-10-CM

## 2024-04-09 PROCEDURE — 70498 CT ANGIOGRAPHY NECK: CPT | Performed by: RADIOLOGY

## 2024-04-09 PROCEDURE — 70496 CT ANGIOGRAPHY HEAD: CPT | Performed by: RADIOLOGY

## 2024-04-09 PROCEDURE — 70498 CT ANGIOGRAPHY NECK: CPT

## 2024-04-09 PROCEDURE — 2550000001 HC RX 255 CONTRASTS: Performed by: SURGERY

## 2024-04-09 RX ADMIN — IOHEXOL 100 ML: 350 INJECTION, SOLUTION INTRAVENOUS at 16:45

## 2024-04-12 ENCOUNTER — OFFICE VISIT (OUTPATIENT)
Dept: VASCULAR SURGERY | Facility: CLINIC | Age: 74
End: 2024-04-12
Payer: COMMERCIAL

## 2024-04-12 ENCOUNTER — APPOINTMENT (OUTPATIENT)
Dept: VASCULAR SURGERY | Facility: CLINIC | Age: 74
End: 2024-04-12
Payer: COMMERCIAL

## 2024-04-12 VITALS
HEIGHT: 67 IN | SYSTOLIC BLOOD PRESSURE: 154 MMHG | HEART RATE: 56 BPM | WEIGHT: 177 LBS | DIASTOLIC BLOOD PRESSURE: 70 MMHG | BODY MASS INDEX: 27.78 KG/M2

## 2024-04-12 DIAGNOSIS — I65.21 STENOSIS OF RIGHT CAROTID ARTERY: Primary | ICD-10-CM

## 2024-04-12 PROCEDURE — 3050F LDL-C >= 130 MG/DL: CPT | Performed by: SURGERY

## 2024-04-12 PROCEDURE — 99214 OFFICE O/P EST MOD 30 MIN: CPT | Performed by: SURGERY

## 2024-04-12 PROCEDURE — 3077F SYST BP >= 140 MM HG: CPT | Performed by: SURGERY

## 2024-04-12 PROCEDURE — 4010F ACE/ARB THERAPY RXD/TAKEN: CPT | Performed by: SURGERY

## 2024-04-12 PROCEDURE — 3044F HG A1C LEVEL LT 7.0%: CPT | Performed by: SURGERY

## 2024-04-12 PROCEDURE — 3078F DIAST BP <80 MM HG: CPT | Performed by: SURGERY

## 2024-04-12 PROCEDURE — 1159F MED LIST DOCD IN RCRD: CPT | Performed by: SURGERY

## 2024-04-12 PROCEDURE — 3062F POS MACROALBUMINURIA REV: CPT | Performed by: SURGERY

## 2024-04-12 PROCEDURE — 1160F RVW MEDS BY RX/DR IN RCRD: CPT | Performed by: SURGERY

## 2024-04-14 ENCOUNTER — PREP FOR PROCEDURE (OUTPATIENT)
Dept: VASCULAR SURGERY | Facility: HOSPITAL | Age: 74
End: 2024-04-14
Payer: COMMERCIAL

## 2024-04-14 DIAGNOSIS — I65.21 STENOSIS OF RIGHT CAROTID ARTERY: Primary | ICD-10-CM

## 2024-04-14 RX ORDER — CEFAZOLIN SODIUM 2 G/100ML
2 INJECTION, SOLUTION INTRAVENOUS ONCE
Status: CANCELLED | OUTPATIENT
Start: 2024-04-16

## 2024-04-14 RX ORDER — SODIUM CHLORIDE 9 MG/ML
100 INJECTION, SOLUTION INTRAVENOUS CONTINUOUS
Status: CANCELLED | OUTPATIENT
Start: 2024-04-16

## 2024-04-15 NOTE — PREPROCEDURE INSTRUCTIONS
Current Medications   Medication Instructions    amLODIPine (Norvasc) 5 mg tablet Continue until night before surgery    aspirin 81 mg chewable tablet Stop 1 day before surgery    atorvastatin (Lipitor) 80 mg tablet Continue until night before surgery    glipiZIDE XL (Glucotrol XL) 5 mg 24 hr tablet Continue until night before surgery    lisinopril 40 mg tablet Continue until night before surgery    metFORMIN (Glucophage) 1,000 mg tablet Continue until night before surgery    nebivolol (Bystolic) 20 mg tablet Take morning of surgery with sip of water, no other fluids    Toujeo SoloStar U-300 Insulin 300 unit/mL (1.5 mL) injection Continue until night before surgery            NPO Instructions:    Do not eat any food after midnight the night before your surgery/procedure.     Additional Instructions:     Day of Surgery:  Review your medication instructions, take indicated medications    Arrive in registration at 11:00 am for 1:00 pm surgery.    Enter through main entrance of [facility] hospital building, located at [address]. Proceed to registration, located in the back right hand corner. You will need your ID and insurance card for registration. Please ensure you have a responsible adult to drive you home.     Shower the morning of or night before your procedure. After you shower avoid lotions, powders, deodorants or anything applied to the skin. If you wear contacts or glasses, wear the glasses. If you do not have glasses, please bring a case for your contacts. You may wear hearing aids and dentures, bring a case for them or we will provide one. Make sure you wear something loose and comfortable. Keep in mind your surgery type and wear something that will accommodate incisions or bandages. Please remove all jewelry.   You may take medications discussed during phone call with a small sip of water.    For further questions Sadiq MCGILL can be contacted at [phone] between  7AM-3PM.

## 2024-04-15 NOTE — H&P (VIEW-ONLY)
"Harsha Herring is a 73 y.o. male     Subjective   This patient presents today for follow-up of his severe right carotid artery disease.  He currently denies any constitutional symptoms associated with his carotid artery disease.  He has never smoked.  He is taking aspirin and a statin.  He is currently a diabetic.  He denies any fever chills nausea vomiting or headache         Objective     Vitals:    04/12/24 1257   BP: 154/70   Pulse: 56      Physical Exam  This patient is alert and oriented x3.  No acute distress.  Head is normocephalic.  Pupils are equal and reactive to light and accommodation.  Neck is soft and supple without palpable lymph nodes.  Heart is regular rate.  Lungs are clear.  Abdomen is soft with positive bowel sounds there is no pain on palpation.  Upper and lower extremities have adequate range of motion with palpable brachial radial femoral and popliteal pulses.  Skin turgor is adequate.  Psychologically the patient appears to be acting appropriately.  He does have a high-pitched carotid bruit on the right  Blood pressure 154/70, pulse 56, height 1.702 m (5' 7\"), weight 80.3 kg (177 lb).            Patient Active Problem List    Diagnosis Date Noted    Stenosis of right carotid artery 07/24/2023    Chronic pain of both shoulders 07/24/2023    Benign essential hypertension 07/24/2023    CVA (cerebral vascular accident) (Multi) 07/24/2023    Gout 07/24/2023    Type 2 diabetes mellitus (Multi) 07/24/2023    Pure hypercholesterolemia 07/24/2023    Diabetes mellitus screening 07/24/2023    Screening for prostate cancer 07/24/2023    Routine general medical examination at health care facility 07/24/2023          Current Outpatient Medications:     amLODIPine (Norvasc) 5 mg tablet, Take 1 tablet (5 mg) by mouth once daily., Disp: 90 tablet, Rfl: 1    aspirin 81 mg chewable tablet, Chew once daily., Disp: , Rfl:     atenolol (Tenormin) 50 mg tablet, Take 1 tablet (50 mg) by mouth once daily., Disp: , " Rfl:     atorvastatin (Lipitor) 80 mg tablet, Take 1 tablet (80 mg) by mouth once daily., Disp: 90 tablet, Rfl: 0    blood sugar diagnostic (OneTouch Verio test strips) strip, 1 strip by subdermal route once daily., Disp: 90 strip, Rfl: 0    glipiZIDE XL (Glucotrol XL) 5 mg 24 hr tablet, TAKE 1 TABLET DAILY (NEED APPOINTMENT FOR FURTHER REFILLS. CALL TODAY FOR APPOINTMENT), Disp: 90 tablet, Rfl: 0    lisinopril 40 mg tablet, Take 1 tablet (40 mg) by mouth once daily., Disp: 90 tablet, Rfl: 1    metFORMIN (Glucophage) 1,000 mg tablet, Take 1 tablet (1,000 mg) by mouth 2 times a day., Disp: 180 tablet, Rfl: 1    nebivolol (Bystolic) 20 mg tablet, Take 1 tablet (20 mg) by mouth once daily., Disp: 90 tablet, Rfl: 1    Toujeo SoloStar U-300 Insulin 300 unit/mL (1.5 mL) injection, INJECT 15 UNITS SUBCUTANEOUSLY DAILY, Disp: 1.5 mL, Rfl: 1     Lab Results   Component Value Date    WBC 4.9 07/10/2019    HGB 13.9 07/10/2019    HCT 41.3 07/10/2019     07/10/2019    CHOL 278 (H) 02/06/2024    TRIG 282 (H) 02/06/2024    HDL 49.2 02/06/2024    ALT 15 02/06/2024    AST 13 02/06/2024     03/27/2024    K 5.2 03/27/2024     03/27/2024    CREATININE 1.34 (H) 03/27/2024    BUN 24 (H) 03/27/2024    CO2 24 03/27/2024    PSA 0.61 07/10/2019    HGBA1C 6.3 (H) 02/06/2024       CT angio head and neck w and wo IV contrast    Result Date: 4/10/2024  Interpreted By:  Roddy Gordon, STUDY: CT ANGIO HEAD AND NECK W AND WO IV CONTRAST;  4/9/2024 4:40 pm   INDICATION: Signs/Symptoms:SEVERE CAROTID.   COMPARISON: None.   ACCESSION NUMBER(S): [id number]   ORDERING CLINICIAN: CASH SHELBY   TECHNIQUE: Following intravenous injection of contrast material, CT was acquired from the aortic arch to the vertex of the skull. Multiplanar reconstructions and 3D reconstructions were made.3D reconstructions, MIPs, Volume Rendered, or Surface Shading image were performed at a separate workstation   FINDINGS: Chest   The aortic arch and  origin of great vessels are normal. The visualized mediastinum and pulmonary apices are normal.   Neck   Right carotid The common carotid artery is normal. There is advanced atherosclerotic calcified and noncalcified plaque at the origin of the right internal carotid artery with associated filling defects best seen on axial 69/173. A 1 mm lumen remains at this location. The cervical and petrous portions are normal. There are minor atherosclerotic calcifications in the Liss cavernous portions.   Left carotid Common carotid artery is normal. There are atherosclerotic calcified and noncalcified plaques at the bifurcation without measurable luminal narrowing. The cervical, petrous and cavernous portions are normal   Vertebrals   The right vertebral artery is dominant. The left vertebral artery is non-opacified. Right vertebral artery origin is normal. The cervical and intradural portions are normal. The vertebrobasilar junction and basilar artery are normal. The distal left vertebral and PICA are opacify via collaterals. The basilar artery is normal.   Soft tissue neck     There is no evidence of mass, cyst or adenopathy within the neck   Intracranial   There is no evidence of aneurysm, vascular malformation or branch occlusion.       * Severe stenosis at the origin of the right internal carotid artery. *Atherosclerotic changes with minimal narrowing at the origin of the left internal carotid artery.   MACRO: Critical Finding:  See findings. Notification was initiated on 4/10/2024 at 7:29 am by  Roddy Gordon.  (**-OCF-**)   Signed by: Roddy Gordon 4/10/2024 7:29 AM Dictation workstation:   [id number]                Assessment/Plan   Active Problems:  There are no active Hospital Problems.      This patient presents today for follow-up of his carotid artery disease.  He currently denies any constitutional symptoms associated with his carotid artery disease.  He recently had a CTA of his head and neck which  shows a critical stenosis involving the origin of his right internal carotid artery.  My recommendations are to do a right carotid endarterectomy.  Risk and benefits have been explained in detail.  He agrees to proceed with the surgery.  We will schedule him accordingly.      Spenser Moreno, DO

## 2024-04-15 NOTE — PROGRESS NOTES
"Harsha Herring is a 73 y.o. male     Subjective   This patient presents today for follow-up of his severe right carotid artery disease.  He currently denies any constitutional symptoms associated with his carotid artery disease.  He has never smoked.  He is taking aspirin and a statin.  He is currently a diabetic.  He denies any fever chills nausea vomiting or headache         Objective     Vitals:    04/12/24 1257   BP: 154/70   Pulse: 56      Physical Exam  This patient is alert and oriented x3.  No acute distress.  Head is normocephalic.  Pupils are equal and reactive to light and accommodation.  Neck is soft and supple without palpable lymph nodes.  Heart is regular rate.  Lungs are clear.  Abdomen is soft with positive bowel sounds there is no pain on palpation.  Upper and lower extremities have adequate range of motion with palpable brachial radial femoral and popliteal pulses.  Skin turgor is adequate.  Psychologically the patient appears to be acting appropriately.  He does have a high-pitched carotid bruit on the right  Blood pressure 154/70, pulse 56, height 1.702 m (5' 7\"), weight 80.3 kg (177 lb).            Patient Active Problem List    Diagnosis Date Noted    Stenosis of right carotid artery 07/24/2023    Chronic pain of both shoulders 07/24/2023    Benign essential hypertension 07/24/2023    CVA (cerebral vascular accident) (Multi) 07/24/2023    Gout 07/24/2023    Type 2 diabetes mellitus (Multi) 07/24/2023    Pure hypercholesterolemia 07/24/2023    Diabetes mellitus screening 07/24/2023    Screening for prostate cancer 07/24/2023    Routine general medical examination at health care facility 07/24/2023          Current Outpatient Medications:     amLODIPine (Norvasc) 5 mg tablet, Take 1 tablet (5 mg) by mouth once daily., Disp: 90 tablet, Rfl: 1    aspirin 81 mg chewable tablet, Chew once daily., Disp: , Rfl:     atenolol (Tenormin) 50 mg tablet, Take 1 tablet (50 mg) by mouth once daily., Disp: , " Rfl:     atorvastatin (Lipitor) 80 mg tablet, Take 1 tablet (80 mg) by mouth once daily., Disp: 90 tablet, Rfl: 0    blood sugar diagnostic (OneTouch Verio test strips) strip, 1 strip by subdermal route once daily., Disp: 90 strip, Rfl: 0    glipiZIDE XL (Glucotrol XL) 5 mg 24 hr tablet, TAKE 1 TABLET DAILY (NEED APPOINTMENT FOR FURTHER REFILLS. CALL TODAY FOR APPOINTMENT), Disp: 90 tablet, Rfl: 0    lisinopril 40 mg tablet, Take 1 tablet (40 mg) by mouth once daily., Disp: 90 tablet, Rfl: 1    metFORMIN (Glucophage) 1,000 mg tablet, Take 1 tablet (1,000 mg) by mouth 2 times a day., Disp: 180 tablet, Rfl: 1    nebivolol (Bystolic) 20 mg tablet, Take 1 tablet (20 mg) by mouth once daily., Disp: 90 tablet, Rfl: 1    Toujeo SoloStar U-300 Insulin 300 unit/mL (1.5 mL) injection, INJECT 15 UNITS SUBCUTANEOUSLY DAILY, Disp: 1.5 mL, Rfl: 1     Lab Results   Component Value Date    WBC 4.9 07/10/2019    HGB 13.9 07/10/2019    HCT 41.3 07/10/2019     07/10/2019    CHOL 278 (H) 02/06/2024    TRIG 282 (H) 02/06/2024    HDL 49.2 02/06/2024    ALT 15 02/06/2024    AST 13 02/06/2024     03/27/2024    K 5.2 03/27/2024     03/27/2024    CREATININE 1.34 (H) 03/27/2024    BUN 24 (H) 03/27/2024    CO2 24 03/27/2024    PSA 0.61 07/10/2019    HGBA1C 6.3 (H) 02/06/2024       CT angio head and neck w and wo IV contrast    Result Date: 4/10/2024  Interpreted By:  Roddy Gordon, STUDY: CT ANGIO HEAD AND NECK W AND WO IV CONTRAST;  4/9/2024 4:40 pm   INDICATION: Signs/Symptoms:SEVERE CAROTID.   COMPARISON: None.   ACCESSION NUMBER(S): [id number]   ORDERING CLINICIAN: CASH SHELBY   TECHNIQUE: Following intravenous injection of contrast material, CT was acquired from the aortic arch to the vertex of the skull. Multiplanar reconstructions and 3D reconstructions were made.3D reconstructions, MIPs, Volume Rendered, or Surface Shading image were performed at a separate workstation   FINDINGS: Chest   The aortic arch and  origin of great vessels are normal. The visualized mediastinum and pulmonary apices are normal.   Neck   Right carotid The common carotid artery is normal. There is advanced atherosclerotic calcified and noncalcified plaque at the origin of the right internal carotid artery with associated filling defects best seen on axial 69/173. A 1 mm lumen remains at this location. The cervical and petrous portions are normal. There are minor atherosclerotic calcifications in the Liss cavernous portions.   Left carotid Common carotid artery is normal. There are atherosclerotic calcified and noncalcified plaques at the bifurcation without measurable luminal narrowing. The cervical, petrous and cavernous portions are normal   Vertebrals   The right vertebral artery is dominant. The left vertebral artery is non-opacified. Right vertebral artery origin is normal. The cervical and intradural portions are normal. The vertebrobasilar junction and basilar artery are normal. The distal left vertebral and PICA are opacify via collaterals. The basilar artery is normal.   Soft tissue neck     There is no evidence of mass, cyst or adenopathy within the neck   Intracranial   There is no evidence of aneurysm, vascular malformation or branch occlusion.       * Severe stenosis at the origin of the right internal carotid artery. *Atherosclerotic changes with minimal narrowing at the origin of the left internal carotid artery.   MACRO: Critical Finding:  See findings. Notification was initiated on 4/10/2024 at 7:29 am by  Roddy Gordon.  (**-OCF-**)   Signed by: Roddy Gordon 4/10/2024 7:29 AM Dictation workstation:   [id number]                Assessment/Plan   Active Problems:  There are no active Hospital Problems.      This patient presents today for follow-up of his carotid artery disease.  He currently denies any constitutional symptoms associated with his carotid artery disease.  He recently had a CTA of his head and neck which  shows a critical stenosis involving the origin of his right internal carotid artery.  My recommendations are to do a right carotid endarterectomy.  Risk and benefits have been explained in detail.  He agrees to proceed with the surgery.  We will schedule him accordingly.      Spenser Moreno, DO

## 2024-04-16 ENCOUNTER — ANESTHESIA EVENT (OUTPATIENT)
Dept: OPERATING ROOM | Facility: HOSPITAL | Age: 74
DRG: 026 | End: 2024-04-16
Payer: COMMERCIAL

## 2024-04-16 ENCOUNTER — HOSPITAL ENCOUNTER (INPATIENT)
Facility: HOSPITAL | Age: 74
LOS: 1 days | Discharge: HOME | DRG: 026 | End: 2024-04-17
Attending: SURGERY | Admitting: SURGERY
Payer: COMMERCIAL

## 2024-04-16 ENCOUNTER — ANESTHESIA (OUTPATIENT)
Dept: OPERATING ROOM | Facility: HOSPITAL | Age: 74
DRG: 026 | End: 2024-04-16
Payer: COMMERCIAL

## 2024-04-16 ENCOUNTER — HOSPITAL ENCOUNTER (OUTPATIENT)
Dept: CARDIOLOGY | Facility: HOSPITAL | Age: 74
Discharge: HOME | End: 2024-04-16
Payer: COMMERCIAL

## 2024-04-16 DIAGNOSIS — I65.21 STENOSIS OF RIGHT CAROTID ARTERY: ICD-10-CM

## 2024-04-16 DIAGNOSIS — I65.21 STENOSIS OF RIGHT CAROTID ARTERY: Primary | ICD-10-CM

## 2024-04-16 LAB
ABO GROUP (TYPE) IN BLOOD: NORMAL
ABO GROUP (TYPE) IN BLOOD: NORMAL
ANION GAP SERPL CALC-SCNC: 14 MMOL/L (ref 10–20)
ANTIBODY SCREEN: NORMAL
BUN SERPL-MCNC: 26 MG/DL (ref 6–23)
CALCIUM SERPL-MCNC: 9.9 MG/DL (ref 8.6–10.3)
CHLORIDE SERPL-SCNC: 103 MMOL/L (ref 98–107)
CO2 SERPL-SCNC: 25 MMOL/L (ref 21–32)
CREAT SERPL-MCNC: 1.27 MG/DL (ref 0.5–1.3)
EGFRCR SERPLBLD CKD-EPI 2021: 60 ML/MIN/1.73M*2
ERYTHROCYTE [DISTWIDTH] IN BLOOD BY AUTOMATED COUNT: 12.5 % (ref 11.5–14.5)
GLUCOSE BLD MANUAL STRIP-MCNC: 140 MG/DL (ref 74–99)
GLUCOSE BLD MANUAL STRIP-MCNC: 157 MG/DL (ref 74–99)
GLUCOSE BLD MANUAL STRIP-MCNC: 201 MG/DL (ref 74–99)
GLUCOSE SERPL-MCNC: 157 MG/DL (ref 74–99)
HCT VFR BLD AUTO: 41.6 % (ref 41–52)
HGB BLD-MCNC: 13.9 G/DL (ref 13.5–17.5)
MCH RBC QN AUTO: 32.6 PG (ref 26–34)
MCHC RBC AUTO-ENTMCNC: 33.4 G/DL (ref 32–36)
MCV RBC AUTO: 97 FL (ref 80–100)
NRBC BLD-RTO: 0 /100 WBCS (ref 0–0)
PLATELET # BLD AUTO: 215 X10*3/UL (ref 150–450)
POTASSIUM SERPL-SCNC: 4.1 MMOL/L (ref 3.5–5.3)
POTASSIUM SERPL-SCNC: 7.1 MMOL/L (ref 3.5–5.3)
RBC # BLD AUTO: 4.27 X10*6/UL (ref 4.5–5.9)
RH FACTOR (ANTIGEN D): NORMAL
RH FACTOR (ANTIGEN D): NORMAL
SODIUM SERPL-SCNC: 135 MMOL/L (ref 136–145)
WBC # BLD AUTO: 6.2 X10*3/UL (ref 4.4–11.3)

## 2024-04-16 PROCEDURE — 7100000002 HC RECOVERY ROOM TIME - EACH INCREMENTAL 1 MINUTE: Performed by: SURGERY

## 2024-04-16 PROCEDURE — 86900 BLOOD TYPING SEROLOGIC ABO: CPT | Mod: 91 | Performed by: SURGERY

## 2024-04-16 PROCEDURE — 93005 ELECTROCARDIOGRAM TRACING: CPT

## 2024-04-16 PROCEDURE — 2500000004 HC RX 250 GENERAL PHARMACY W/ HCPCS (ALT 636 FOR OP/ED): Performed by: SURGERY

## 2024-04-16 PROCEDURE — 82947 ASSAY GLUCOSE BLOOD QUANT: CPT

## 2024-04-16 PROCEDURE — 36620 INSERTION CATHETER ARTERY: CPT | Performed by: ANESTHESIOLOGY

## 2024-04-16 PROCEDURE — C1751 CATH, INF, PER/CENT/MIDLINE: HCPCS | Performed by: SURGERY

## 2024-04-16 PROCEDURE — A4217 STERILE WATER/SALINE, 500 ML: HCPCS | Performed by: SURGERY

## 2024-04-16 PROCEDURE — 03CH3ZZ EXTIRPATION OF MATTER FROM RIGHT COMMON CAROTID ARTERY, PERCUTANEOUS APPROACH: ICD-10-PCS | Performed by: SURGERY

## 2024-04-16 PROCEDURE — 3600000009 HC OR TIME - EACH INCREMENTAL 1 MINUTE - PROCEDURE LEVEL FOUR: Performed by: SURGERY

## 2024-04-16 PROCEDURE — 2500000004 HC RX 250 GENERAL PHARMACY W/ HCPCS (ALT 636 FOR OP/ED)

## 2024-04-16 PROCEDURE — 2020000001 HC ICU ROOM DAILY

## 2024-04-16 PROCEDURE — 3700000002 HC GENERAL ANESTHESIA TIME - EACH INCREMENTAL 1 MINUTE: Performed by: SURGERY

## 2024-04-16 PROCEDURE — 2780000003 HC OR 278 NO HCPCS: Performed by: SURGERY

## 2024-04-16 PROCEDURE — 36415 COLL VENOUS BLD VENIPUNCTURE: CPT | Performed by: SURGERY

## 2024-04-16 PROCEDURE — 88304 TISSUE EXAM BY PATHOLOGIST: CPT | Mod: TC,PARLAB,91 | Performed by: SURGERY

## 2024-04-16 PROCEDURE — 03CM3ZZ EXTIRPATION OF MATTER FROM RIGHT EXTERNAL CAROTID ARTERY, PERCUTANEOUS APPROACH: ICD-10-PCS | Performed by: SURGERY

## 2024-04-16 PROCEDURE — 3600000004 HC OR TIME - INITIAL BASE CHARGE - PROCEDURE LEVEL FOUR: Performed by: SURGERY

## 2024-04-16 PROCEDURE — A35301 PR THROMBOENDARTECTMY NECK,NECK INCIS: Performed by: NURSE ANESTHETIST, CERTIFIED REGISTERED

## 2024-04-16 PROCEDURE — 2500000005 HC RX 250 GENERAL PHARMACY W/O HCPCS: Performed by: NURSE ANESTHETIST, CERTIFIED REGISTERED

## 2024-04-16 PROCEDURE — 99100 ANES PT EXTEME AGE<1 YR&>70: CPT | Performed by: ANESTHESIOLOGY

## 2024-04-16 PROCEDURE — 2720000007 HC OR 272 NO HCPCS: Performed by: SURGERY

## 2024-04-16 PROCEDURE — 85027 COMPLETE CBC AUTOMATED: CPT | Performed by: SURGERY

## 2024-04-16 PROCEDURE — 93010 ELECTROCARDIOGRAM REPORT: CPT | Performed by: INTERNAL MEDICINE

## 2024-04-16 PROCEDURE — 88305 TISSUE EXAM BY PATHOLOGIST: CPT | Performed by: PATHOLOGY

## 2024-04-16 PROCEDURE — 03CK0ZZ EXTIRPATION OF MATTER FROM RIGHT INTERNAL CAROTID ARTERY, OPEN APPROACH: ICD-10-PCS | Performed by: SURGERY

## 2024-04-16 PROCEDURE — 80048 BASIC METABOLIC PNL TOTAL CA: CPT | Performed by: SURGERY

## 2024-04-16 PROCEDURE — 2500000001 HC RX 250 WO HCPCS SELF ADMINISTERED DRUGS (ALT 637 FOR MEDICARE OP): Performed by: SURGERY

## 2024-04-16 PROCEDURE — 7100000001 HC RECOVERY ROOM TIME - INITIAL BASE CHARGE: Performed by: SURGERY

## 2024-04-16 PROCEDURE — 3700000001 HC GENERAL ANESTHESIA TIME - INITIAL BASE CHARGE: Performed by: SURGERY

## 2024-04-16 PROCEDURE — 2500000004 HC RX 250 GENERAL PHARMACY W/ HCPCS (ALT 636 FOR OP/ED): Mod: JZ | Performed by: SURGERY

## 2024-04-16 PROCEDURE — 88311 DECALCIFY TISSUE: CPT | Performed by: PATHOLOGY

## 2024-04-16 PROCEDURE — C1762 CONN TISS, HUMAN(INC FASCIA): HCPCS | Performed by: SURGERY

## 2024-04-16 PROCEDURE — 99223 1ST HOSP IP/OBS HIGH 75: CPT

## 2024-04-16 PROCEDURE — 84132 ASSAY OF SERUM POTASSIUM: CPT | Performed by: SURGERY

## 2024-04-16 PROCEDURE — 35301 RECHANNELING OF ARTERY: CPT | Performed by: SURGERY

## 2024-04-16 PROCEDURE — A35301 PR THROMBOENDARTECTMY NECK,NECK INCIS: Performed by: ANESTHESIOLOGY

## 2024-04-16 PROCEDURE — 88304 TISSUE EXAM BY PATHOLOGIST: CPT | Performed by: PATHOLOGY

## 2024-04-16 PROCEDURE — 2500000004 HC RX 250 GENERAL PHARMACY W/ HCPCS (ALT 636 FOR OP/ED): Performed by: NURSE ANESTHETIST, CERTIFIED REGISTERED

## 2024-04-16 PROCEDURE — 2500000005 HC RX 250 GENERAL PHARMACY W/O HCPCS: Performed by: SURGERY

## 2024-04-16 DEVICE — XENOSURE BIOLOGIC PATCH, 0.8CM X 8CM, EIFU
Type: IMPLANTABLE DEVICE | Site: CAROTID | Status: FUNCTIONAL
Brand: XENOSURE BIOLOGIC PATCH

## 2024-04-16 RX ORDER — ROCURONIUM BROMIDE 10 MG/ML
INJECTION, SOLUTION INTRAVENOUS AS NEEDED
Status: DISCONTINUED | OUTPATIENT
Start: 2024-04-16 | End: 2024-04-16

## 2024-04-16 RX ORDER — DEXTROSE 50 % IN WATER (D50W) INTRAVENOUS SYRINGE
12.5
Status: DISCONTINUED | OUTPATIENT
Start: 2024-04-16 | End: 2024-04-17 | Stop reason: HOSPADM

## 2024-04-16 RX ORDER — ATORVASTATIN CALCIUM 80 MG/1
80 TABLET, FILM COATED ORAL DAILY
Status: DISCONTINUED | OUTPATIENT
Start: 2024-04-16 | End: 2024-04-17 | Stop reason: HOSPADM

## 2024-04-16 RX ORDER — HYDRALAZINE HYDROCHLORIDE 20 MG/ML
10 INJECTION INTRAMUSCULAR; INTRAVENOUS EVERY 30 MIN PRN
Status: DISCONTINUED | OUTPATIENT
Start: 2024-04-16 | End: 2024-04-16 | Stop reason: HOSPADM

## 2024-04-16 RX ORDER — LIDOCAINE HYDROCHLORIDE 10 MG/ML
0.1 INJECTION INFILTRATION; PERINEURAL ONCE
Status: DISCONTINUED | OUTPATIENT
Start: 2024-04-16 | End: 2024-04-16 | Stop reason: HOSPADM

## 2024-04-16 RX ORDER — ONDANSETRON HYDROCHLORIDE 2 MG/ML
INJECTION, SOLUTION INTRAVENOUS AS NEEDED
Status: DISCONTINUED | OUTPATIENT
Start: 2024-04-16 | End: 2024-04-16

## 2024-04-16 RX ORDER — PHENYLEPHRINE 10 MG/250 ML(40 MCG/ML)IN 0.9 % SOD.CHLORIDE INTRAVENOUS
CONTINUOUS PRN
Status: DISCONTINUED | OUTPATIENT
Start: 2024-04-16 | End: 2024-04-16

## 2024-04-16 RX ORDER — LISINOPRIL 40 MG/1
40 TABLET ORAL DAILY
Status: DISCONTINUED | OUTPATIENT
Start: 2024-04-16 | End: 2024-04-17 | Stop reason: HOSPADM

## 2024-04-16 RX ORDER — BUPIVACAINE HYDROCHLORIDE 5 MG/ML
INJECTION, SOLUTION PERINEURAL AS NEEDED
Status: DISCONTINUED | OUTPATIENT
Start: 2024-04-16 | End: 2024-04-16 | Stop reason: HOSPADM

## 2024-04-16 RX ORDER — HYDRALAZINE HYDROCHLORIDE 20 MG/ML
INJECTION INTRAMUSCULAR; INTRAVENOUS
Status: COMPLETED
Start: 2024-04-16 | End: 2024-04-16

## 2024-04-16 RX ORDER — METFORMIN HYDROCHLORIDE 500 MG/1
1000 TABLET ORAL 2 TIMES DAILY
Status: DISCONTINUED | OUTPATIENT
Start: 2024-04-16 | End: 2024-04-16

## 2024-04-16 RX ORDER — HYDROCODONE BITARTRATE AND ACETAMINOPHEN 5; 325 MG/1; MG/1
1 TABLET ORAL EVERY 4 HOURS PRN
Status: DISCONTINUED | OUTPATIENT
Start: 2024-04-16 | End: 2024-04-17 | Stop reason: HOSPADM

## 2024-04-16 RX ORDER — HYDRALAZINE HYDROCHLORIDE 20 MG/ML
10 INJECTION INTRAMUSCULAR; INTRAVENOUS EVERY 4 HOURS PRN
Status: DISCONTINUED | OUTPATIENT
Start: 2024-04-16 | End: 2024-04-17 | Stop reason: HOSPADM

## 2024-04-16 RX ORDER — AMLODIPINE BESYLATE 5 MG/1
5 TABLET ORAL DAILY
Status: DISCONTINUED | OUTPATIENT
Start: 2024-04-16 | End: 2024-04-17 | Stop reason: HOSPADM

## 2024-04-16 RX ORDER — DEXTROSE 50 % IN WATER (D50W) INTRAVENOUS SYRINGE
25
Status: DISCONTINUED | OUTPATIENT
Start: 2024-04-16 | End: 2024-04-17 | Stop reason: HOSPADM

## 2024-04-16 RX ORDER — MIDAZOLAM HYDROCHLORIDE 1 MG/ML
INJECTION, SOLUTION INTRAMUSCULAR; INTRAVENOUS AS NEEDED
Status: DISCONTINUED | OUTPATIENT
Start: 2024-04-16 | End: 2024-04-16

## 2024-04-16 RX ORDER — HYDRALAZINE HYDROCHLORIDE 20 MG/ML
10 INJECTION INTRAMUSCULAR; INTRAVENOUS ONCE
Status: COMPLETED | OUTPATIENT
Start: 2024-04-16 | End: 2024-04-16

## 2024-04-16 RX ORDER — CEFAZOLIN SODIUM 1 G/50ML
1 SOLUTION INTRAVENOUS EVERY 6 HOURS
Qty: 150 ML | Refills: 0 | Status: COMPLETED | OUTPATIENT
Start: 2024-04-16 | End: 2024-04-17

## 2024-04-16 RX ORDER — HEPARIN SODIUM 5000 [USP'U]/ML
INJECTION, SOLUTION INTRAVENOUS; SUBCUTANEOUS AS NEEDED
Status: DISCONTINUED | OUTPATIENT
Start: 2024-04-16 | End: 2024-04-16

## 2024-04-16 RX ORDER — PROPOFOL 10 MG/ML
INJECTION, EMULSION INTRAVENOUS AS NEEDED
Status: DISCONTINUED | OUTPATIENT
Start: 2024-04-16 | End: 2024-04-16

## 2024-04-16 RX ORDER — NAPROXEN SODIUM 220 MG/1
81 TABLET, FILM COATED ORAL DAILY
Status: DISCONTINUED | OUTPATIENT
Start: 2024-04-16 | End: 2024-04-17 | Stop reason: HOSPADM

## 2024-04-16 RX ORDER — PHENYLEPHRINE HCL IN 0.9% NACL 1 MG/10 ML
SYRINGE (ML) INTRAVENOUS AS NEEDED
Status: DISCONTINUED | OUTPATIENT
Start: 2024-04-16 | End: 2024-04-16

## 2024-04-16 RX ORDER — SODIUM CHLORIDE 9 MG/ML
100 INJECTION, SOLUTION INTRAVENOUS CONTINUOUS
Status: DISCONTINUED | OUTPATIENT
Start: 2024-04-16 | End: 2024-04-16

## 2024-04-16 RX ORDER — CEFAZOLIN SODIUM 2 G/100ML
2 INJECTION, SOLUTION INTRAVENOUS ONCE
Status: COMPLETED | OUTPATIENT
Start: 2024-04-16 | End: 2024-04-16

## 2024-04-16 RX ORDER — HEPARIN SODIUM 1000 [USP'U]/ML
INJECTION, SOLUTION INTRAVENOUS; SUBCUTANEOUS AS NEEDED
Status: DISCONTINUED | OUTPATIENT
Start: 2024-04-16 | End: 2024-04-16

## 2024-04-16 RX ORDER — METOPROLOL SUCCINATE 50 MG/1
200 TABLET, EXTENDED RELEASE ORAL DAILY
Status: DISCONTINUED | OUTPATIENT
Start: 2024-04-16 | End: 2024-04-17 | Stop reason: HOSPADM

## 2024-04-16 RX ORDER — FENTANYL CITRATE 50 UG/ML
INJECTION, SOLUTION INTRAMUSCULAR; INTRAVENOUS AS NEEDED
Status: DISCONTINUED | OUTPATIENT
Start: 2024-04-16 | End: 2024-04-16

## 2024-04-16 RX ORDER — DEXAMETHASONE SODIUM PHOSPHATE 100 MG/10ML
INJECTION INTRAMUSCULAR; INTRAVENOUS AS NEEDED
Status: DISCONTINUED | OUTPATIENT
Start: 2024-04-16 | End: 2024-04-16

## 2024-04-16 RX ORDER — SODIUM CHLORIDE, SODIUM LACTATE, POTASSIUM CHLORIDE, CALCIUM CHLORIDE 600; 310; 30; 20 MG/100ML; MG/100ML; MG/100ML; MG/100ML
100 INJECTION, SOLUTION INTRAVENOUS CONTINUOUS
Status: DISCONTINUED | OUTPATIENT
Start: 2024-04-16 | End: 2024-04-16 | Stop reason: HOSPADM

## 2024-04-16 RX ORDER — INSULIN LISPRO 100 [IU]/ML
0-5 INJECTION, SOLUTION INTRAVENOUS; SUBCUTANEOUS
Status: DISCONTINUED | OUTPATIENT
Start: 2024-04-16 | End: 2024-04-17 | Stop reason: HOSPADM

## 2024-04-16 RX ADMIN — HYDRALAZINE HYDROCHLORIDE 10 MG: 20 INJECTION INTRAMUSCULAR; INTRAVENOUS at 20:33

## 2024-04-16 RX ADMIN — HYDRALAZINE HYDROCHLORIDE 10 MG: 20 INJECTION INTRAMUSCULAR; INTRAVENOUS at 16:40

## 2024-04-16 RX ADMIN — AMLODIPINE BESYLATE 5 MG: 5 TABLET ORAL at 17:17

## 2024-04-16 RX ADMIN — DEXAMETHASONE SODIUM PHOSPHATE 4 MG: 10 INJECTION INTRAMUSCULAR; INTRAVENOUS at 13:37

## 2024-04-16 RX ADMIN — PROPOFOL 150 MG: 10 INJECTION, EMULSION INTRAVENOUS at 13:20

## 2024-04-16 RX ADMIN — Medication 0.2 MCG/KG/MIN: at 13:37

## 2024-04-16 RX ADMIN — ASPIRIN 81 MG CHEWABLE TABLET 81 MG: 81 TABLET CHEWABLE at 17:17

## 2024-04-16 RX ADMIN — ROCURONIUM BROMIDE 10 MG: 10 INJECTION, SOLUTION INTRAVENOUS at 14:56

## 2024-04-16 RX ADMIN — SODIUM CHLORIDE 100 ML/HR: 9 INJECTION, SOLUTION INTRAVENOUS at 11:57

## 2024-04-16 RX ADMIN — HEPARIN SODIUM 5000 UNITS: 1000 INJECTION, SOLUTION INTRAVENOUS; SUBCUTANEOUS at 14:07

## 2024-04-16 RX ADMIN — CEFAZOLIN SODIUM 2 G: 2 INJECTION, SOLUTION INTRAVENOUS at 13:30

## 2024-04-16 RX ADMIN — ROCURONIUM BROMIDE 30 MG: 10 INJECTION, SOLUTION INTRAVENOUS at 13:46

## 2024-04-16 RX ADMIN — ONDANSETRON 4 MG: 2 INJECTION, SOLUTION INTRAMUSCULAR; INTRAVENOUS at 13:37

## 2024-04-16 RX ADMIN — ROCURONIUM BROMIDE 30 MG: 10 INJECTION, SOLUTION INTRAVENOUS at 14:20

## 2024-04-16 RX ADMIN — LISINOPRIL 40 MG: 40 TABLET ORAL at 17:17

## 2024-04-16 RX ADMIN — ROCURONIUM BROMIDE 60 MG: 10 INJECTION, SOLUTION INTRAVENOUS at 13:20

## 2024-04-16 RX ADMIN — HYDRALAZINE HYDROCHLORIDE 10 MG: 20 INJECTION INTRAMUSCULAR; INTRAVENOUS at 18:29

## 2024-04-16 RX ADMIN — FENTANYL CITRATE 50 MCG: 50 INJECTION, SOLUTION INTRAMUSCULAR; INTRAVENOUS at 13:20

## 2024-04-16 RX ADMIN — PROPOFOL 20 MG: 10 INJECTION, EMULSION INTRAVENOUS at 14:49

## 2024-04-16 RX ADMIN — ATORVASTATIN CALCIUM 80 MG: 80 TABLET, FILM COATED ORAL at 17:17

## 2024-04-16 RX ADMIN — MIDAZOLAM 1 MG: 1 INJECTION INTRAMUSCULAR; INTRAVENOUS at 13:16

## 2024-04-16 RX ADMIN — CEFAZOLIN SODIUM 1 G: 1 INJECTION, SOLUTION INTRAVENOUS at 19:56

## 2024-04-16 RX ADMIN — Medication 100 MCG: at 15:13

## 2024-04-16 RX ADMIN — Medication 100 MCG: at 15:07

## 2024-04-16 RX ADMIN — SUGAMMADEX 200 MG: 100 INJECTION, SOLUTION INTRAVENOUS at 15:18

## 2024-04-16 RX ADMIN — FENTANYL CITRATE 50 MCG: 50 INJECTION, SOLUTION INTRAMUSCULAR; INTRAVENOUS at 13:47

## 2024-04-16 RX ADMIN — Medication 100 MCG: at 14:41

## 2024-04-16 SDOH — SOCIAL STABILITY: SOCIAL INSECURITY: DOES ANYONE TRY TO KEEP YOU FROM HAVING/CONTACTING OTHER FRIENDS OR DOING THINGS OUTSIDE YOUR HOME?: NO

## 2024-04-16 SDOH — SOCIAL STABILITY: SOCIAL INSECURITY: HAVE YOU HAD ANY THOUGHTS OF HARMING ANYONE ELSE?: NO

## 2024-04-16 SDOH — SOCIAL STABILITY: SOCIAL INSECURITY: ARE THERE ANY APPARENT SIGNS OF INJURIES/BEHAVIORS THAT COULD BE RELATED TO ABUSE/NEGLECT?: NO

## 2024-04-16 SDOH — SOCIAL STABILITY: SOCIAL INSECURITY: DO YOU FEEL ANYONE HAS EXPLOITED OR TAKEN ADVANTAGE OF YOU FINANCIALLY OR OF YOUR PERSONAL PROPERTY?: NO

## 2024-04-16 SDOH — SOCIAL STABILITY: SOCIAL INSECURITY: ARE YOU OR HAVE YOU BEEN THREATENED OR ABUSED PHYSICALLY, EMOTIONALLY, OR SEXUALLY BY ANYONE?: NO

## 2024-04-16 SDOH — SOCIAL STABILITY: SOCIAL INSECURITY: HAVE YOU HAD THOUGHTS OF HARMING ANYONE ELSE?: NO

## 2024-04-16 SDOH — SOCIAL STABILITY: SOCIAL INSECURITY: WERE YOU ABLE TO COMPLETE ALL THE BEHAVIORAL HEALTH SCREENINGS?: YES

## 2024-04-16 SDOH — SOCIAL STABILITY: SOCIAL INSECURITY: HAS ANYONE EVER THREATENED TO HURT YOUR FAMILY OR YOUR PETS?: NO

## 2024-04-16 SDOH — HEALTH STABILITY: MENTAL HEALTH: CURRENT SMOKER: 0

## 2024-04-16 SDOH — SOCIAL STABILITY: SOCIAL INSECURITY: ABUSE: ADULT

## 2024-04-16 SDOH — SOCIAL STABILITY: SOCIAL INSECURITY: DO YOU FEEL UNSAFE GOING BACK TO THE PLACE WHERE YOU ARE LIVING?: NO

## 2024-04-16 ASSESSMENT — LIFESTYLE VARIABLES
AUDIT TOTAL SCORE: 0
HOW OFTEN DURING THE LAST YEAR HAVE YOU FAILED TO DO WHAT WAS NORMALLY EXPECTED FROM YOU BECAUSE OF DRINKING: NEVER
HOW OFTEN DURING THE LAST YEAR HAVE YOU FOUND THAT YOU WERE NOT ABLE TO STOP DRINKING ONCE YOU HAD STARTED: NEVER
AUDIT-C TOTAL SCORE: 10
HAS A RELATIVE, FRIEND, DOCTOR, OR ANOTHER HEALTH PROFESSIONAL EXPRESSED CONCERN ABOUT YOUR DRINKING OR SUGGESTED YOU CUT DOWN: NO
AUDIT-C TOTAL SCORE: 10
AUDIT TOTAL SCORE: 10
HOW OFTEN DURING THE LAST YEAR HAVE YOU BEEN UNABLE TO REMEMBER WHAT HAPPENED THE NIGHT BEFORE BECAUSE YOU HAD BEEN DRINKING: NEVER
SKIP TO QUESTIONS 9-10: 0
HOW OFTEN DO YOU HAVE 6 OR MORE DRINKS ON ONE OCCASION: WEEKLY
HOW OFTEN DURING THE LAST YEAR HAVE YOU NEEDED AN ALCOHOLIC DRINK FIRST THING IN THE MORNING TO GET YOURSELF GOING AFTER A NIGHT OF HEAVY DRINKING: NEVER
HAVE YOU OR SOMEONE ELSE BEEN INJURED AS A RESULT OF YOUR DRINKING: NO
HOW MANY STANDARD DRINKS CONTAINING ALCOHOL DO YOU HAVE ON A TYPICAL DAY: 10 OR MORE
HOW OFTEN DURING THE LAST YEAR HAVE YOU HAD A FEELING OF GUILT OR REMORSE AFTER DRINKING: NEVER
HOW OFTEN DO YOU HAVE A DRINK CONTAINING ALCOHOL: 2-3 TIMES A WEEK

## 2024-04-16 ASSESSMENT — PAIN SCALES - GENERAL
PAINLEVEL_OUTOF10: 0 - NO PAIN

## 2024-04-16 ASSESSMENT — PATIENT HEALTH QUESTIONNAIRE - PHQ9
SUM OF ALL RESPONSES TO PHQ9 QUESTIONS 1 & 2: 0
1. LITTLE INTEREST OR PLEASURE IN DOING THINGS: NOT AT ALL
2. FEELING DOWN, DEPRESSED OR HOPELESS: NOT AT ALL

## 2024-04-16 ASSESSMENT — ACTIVITIES OF DAILY LIVING (ADL)
ASSISTIVE_DEVICE: DENTURES LOWER;DENTURES UPPER
GROOMING: INDEPENDENT
PATIENT'S MEMORY ADEQUATE TO SAFELY COMPLETE DAILY ACTIVITIES?: YES
HEARING - LEFT EAR: FUNCTIONAL
FEEDING YOURSELF: INDEPENDENT
TOILETING: INDEPENDENT
HEARING - RIGHT EAR: FUNCTIONAL
ADEQUATE_TO_COMPLETE_ADL: YES
BATHING: INDEPENDENT
DRESSING YOURSELF: INDEPENDENT
LACK_OF_TRANSPORTATION: NO
WALKS IN HOME: INDEPENDENT
JUDGMENT_ADEQUATE_SAFELY_COMPLETE_DAILY_ACTIVITIES: YES

## 2024-04-16 ASSESSMENT — COGNITIVE AND FUNCTIONAL STATUS - GENERAL
MOVING TO AND FROM BED TO CHAIR: A LITTLE
WALKING IN HOSPITAL ROOM: A LITTLE
DRESSING REGULAR UPPER BODY CLOTHING: A LITTLE
CLIMB 3 TO 5 STEPS WITH RAILING: A LITTLE
STANDING UP FROM CHAIR USING ARMS: A LITTLE
MOBILITY SCORE: 18
HELP NEEDED FOR BATHING: A LITTLE
TURNING FROM BACK TO SIDE WHILE IN FLAT BAD: A LITTLE
TOILETING: A LITTLE
PATIENT BASELINE BEDBOUND: NO
MOVING FROM LYING ON BACK TO SITTING ON SIDE OF FLAT BED WITH BEDRAILS: A LITTLE
DAILY ACTIVITIY SCORE: 20
DRESSING REGULAR LOWER BODY CLOTHING: A LITTLE

## 2024-04-16 ASSESSMENT — PAIN - FUNCTIONAL ASSESSMENT
PAIN_FUNCTIONAL_ASSESSMENT: 0-10

## 2024-04-16 ASSESSMENT — COLUMBIA-SUICIDE SEVERITY RATING SCALE - C-SSRS
6. HAVE YOU EVER DONE ANYTHING, STARTED TO DO ANYTHING, OR PREPARED TO DO ANYTHING TO END YOUR LIFE?: NO
1. IN THE PAST MONTH, HAVE YOU WISHED YOU WERE DEAD OR WISHED YOU COULD GO TO SLEEP AND NOT WAKE UP?: NO
2. HAVE YOU ACTUALLY HAD ANY THOUGHTS OF KILLING YOURSELF?: NO

## 2024-04-16 NOTE — ANESTHESIA PREPROCEDURE EVALUATION
Patient: Harsha Herring    Procedure Information       Date/Time: 04/16/24 1300    Procedure: RIGHT CAROTID ENDARTECTOMY (Right) - Right carotid endarterectomy    Location: PAR OR 12 / [facility] OR    Surgeons: Spenser Moreno DO            Relevant Problems   Anesthesia (within normal limits)      Cardiac   (+) Benign essential hypertension   (+) Pure hypercholesterolemia      Neuro   (+) CVA (cerebral vascular accident) (Multi)   (+) Stenosis of right carotid artery      Endocrine   (+) Type 2 diabetes mellitus (Multi)       Clinical information reviewed:   Tobacco  Allergies  Meds   Med Hx  Surg Hx   Fam Hx  Soc Hx        NPO Detail:  NPO/Void Status  Carbohydrate Drink Given Prior to Surgery? : N  Date of Last Liquid: 04/16/24  Time of Last Liquid: 0830  Date of Last Solid: 04/15/24  Time of Last Solid: 1900  Last Intake Type: Light meal  Time of Last Void: 1119         Physical Exam    Airway  Mallampati: II  TM distance: >3 FB  Neck ROM: full     Cardiovascular   Rhythm: regular  Rate: normal     Dental - normal exam  (+) lower dentures, upper dentures     Pulmonary    Abdominal        Anesthesia Plan    History of general anesthesia?: yes  History of complications of general anesthesia?: no    ASA 3     general   (A Line )  The patient is not a current smoker.  Patient was not previously instructed to abstain from smoking on day of procedure.  Patient did not smoke on day of procedure.    intravenous induction   Anesthetic plan and risks discussed with patient.  Use of blood products discussed with patient who.    Plan discussed with CRNA.

## 2024-04-16 NOTE — ANESTHESIA PROCEDURE NOTES
Airway  Date/Time: 4/16/2024 1:20 PM  Urgency: elective      Staffing  Performed: CRNA   Authorized by: Edi Moesr MD    Performed by: TEMO De Los Santos-CRNA  Patient location during procedure: OR    Indications and Patient Condition  Indications for airway management: anesthesia  Spontaneous Ventilation: absent  Sedation level: deep  Preoxygenated: yes  Patient position: sniffing  Mask difficulty assessment: 1 - vent by mask    Final Airway Details  Final airway type: endotracheal airway      Successful airway: ETT  Cuffed: yes   Successful intubation technique: direct laryngoscopy  Facilitating devices/methods: intubating stylet  Blade: Evita  Blade size: #3  ETT size (mm): 8.0  Cormack-Lehane Classification: grade I - full view of glottis  Placement verified by: chest auscultation and capnometry   Measured from: lips  ETT to lips (cm): 22  Number of attempts at approach: 1    Additional Comments  LTA used

## 2024-04-16 NOTE — CARE PLAN
Problem: Psychosocial Needs  Goal: Collaborate with me, my family, and caregiver to identify my specific goals  Recent Flowsheet Documentation  Taken 4/16/2024 1728 by Mare Barron RN  Cultural Requests During Hospitalization: n/a  Spiritual Requests During Hospitalization: n/a     The patient's goals for the shift include  to feel better     The clinical goals for the shift include Patient will remain hemodynamically stable    Over the shift, the patient did make progress toward the following goals.

## 2024-04-16 NOTE — ANESTHESIA PROCEDURE NOTES
Arterial Line:    Date/Time: 4/16/2024 1:38 PM    Staffing  Performed: attending   Authorized by: Edi Moser MD    Performed by: ONEIL De Los Santos    An arterial line was placed. Procedure performed using ultrasound guidance.in the OR for the following indication(s): continuous blood pressure monitoring and blood sampling needed.    A 20 gauge (size), 1 and 3/4 inch (length), Arrow (type) catheter was placed into the radial artery, secured by Tegaderm,   Seldinger technique used.  Events:  patient tolerated procedure well with no complications.

## 2024-04-16 NOTE — CONSULTS
"Critical Care Consult    Patient Name: Harsha Herring   YOB: 1950    Subjective:  Harsha Herring is a 73 y.o. male with past medical history of T2DM, HTN, HLD, CVA with residual left sided weakness who presented to [facility] on 4/15/24 for right carotid endarterectomy with Dr. Moreno. ICU was consulted for medical management and blood pressure monitoring.    A 10 point ROS was completed and is negative expect as stated in HPI.     Past Medical History: As stated above.    Past Surgical History: As stated above, hernia repair    Social History: Never smoker, current drinker, no illicit drugs, retired    Family History: Mother - bone cancer    Objective:    /71   Pulse 54   Temp 36.2 °C (97.2 °F) (Temporal)   Resp 16   Ht 1.702 m (5' 7.01\")   Wt 80.2 kg (176 lb 12.9 oz)   SpO2 95%   BMI 27.69 kg/m²     Physical Exam:  GEN: conversant, NAD  HEENT: PERRL, EOMI, MMM OP clear, TMs clear bilaterally  NECK: Right incision wound  CV: S1, S2, regular, no murmur  PULM: CTAB  ABD: soft, NT, ND, BS+  EXT: no LE edema, right arterial line  NEURO: no gross focal deficits  PSYCH: appropriate affect       Assessment/Plan:  Harsha Herring is a 73 y.o. male with past medical history of T2DM, HTN, HLD, CVA with residual left sided weakness who presented to [facility] on 4/15/24 for right carotid endarterectomy with Dr. Moreno. ICU was consulted for medical management and blood pressure monitoring.    Neuro:  - Patient orientated to person place and time  - Monitor for ICU delirium  - Patient requires no sedation or analgesia  - Maintain sleep hygiene  - Pain: Norco Q4 PRN moderate    Cardiovascular  #Right endarterectomy 4/16  - Maintain MAPs>65  - Main SBP < 140, PRN hydralazine  - Continue home meds: amlodipine 5mg, lisinopril 40mg, metoprolol succinate 200mg daily     Pulmonary:  - On RA, no acute issues    GI:  - No acute issues, cardiac diet    Renal:   - Monitor electrolytes    Endocrine:  - Hold metformin, " lispro sliding scale    Heme/Onc:  - Monitor for signs of bleeding    ID:  - Perioperative antibiotics per primary team    ICU CHECK LIST:   Antimicrobials: Ancef  Oxygen: RA  Feeding: Cardiac Diet  Fluids:  ml/hr  Analgesia: Norco  Sedation: None  Thromboprophylaxis: None  Ulcer prophylaxis: None  Glycemic control: ISS  Bowel care: None  Indwelling catheters: None  Lines: PIV, arterial line    Code Status: Full Code      Ray Persaud MD  Critical Care

## 2024-04-17 VITALS
SYSTOLIC BLOOD PRESSURE: 154 MMHG | OXYGEN SATURATION: 97 % | DIASTOLIC BLOOD PRESSURE: 68 MMHG | HEIGHT: 67 IN | BODY MASS INDEX: 27.68 KG/M2 | HEART RATE: 57 BPM | TEMPERATURE: 97.3 F | RESPIRATION RATE: 22 BRPM | WEIGHT: 176.37 LBS

## 2024-04-17 PROBLEM — Z98.890 S/P CAROTID ENDARTERECTOMY: Status: ACTIVE | Noted: 2024-04-17

## 2024-04-17 LAB
ALBUMIN SERPL BCP-MCNC: 3.8 G/DL (ref 3.4–5)
ALP SERPL-CCNC: 66 U/L (ref 33–136)
ALT SERPL W P-5'-P-CCNC: 13 U/L (ref 10–52)
ANION GAP SERPL CALC-SCNC: 14 MMOL/L (ref 10–20)
AST SERPL W P-5'-P-CCNC: 11 U/L (ref 9–39)
BILIRUB SERPL-MCNC: 0.3 MG/DL (ref 0–1.2)
BUN SERPL-MCNC: 21 MG/DL (ref 6–23)
CALCIUM SERPL-MCNC: 8.8 MG/DL (ref 8.6–10.3)
CHLORIDE SERPL-SCNC: 106 MMOL/L (ref 98–107)
CO2 SERPL-SCNC: 22 MMOL/L (ref 21–32)
CREAT SERPL-MCNC: 1.26 MG/DL (ref 0.5–1.3)
EGFRCR SERPLBLD CKD-EPI 2021: 61 ML/MIN/1.73M*2
ERYTHROCYTE [DISTWIDTH] IN BLOOD BY AUTOMATED COUNT: 12.7 % (ref 11.5–14.5)
GLUCOSE BLD MANUAL STRIP-MCNC: 161 MG/DL (ref 74–99)
GLUCOSE BLD MANUAL STRIP-MCNC: 173 MG/DL (ref 74–99)
GLUCOSE SERPL-MCNC: 204 MG/DL (ref 74–99)
HCT VFR BLD AUTO: 37.1 % (ref 41–52)
HGB BLD-MCNC: 12.3 G/DL (ref 13.5–17.5)
MAGNESIUM SERPL-MCNC: 1.67 MG/DL (ref 1.6–2.4)
MCH RBC QN AUTO: 32.5 PG (ref 26–34)
MCHC RBC AUTO-ENTMCNC: 33.2 G/DL (ref 32–36)
MCV RBC AUTO: 98 FL (ref 80–100)
NRBC BLD-RTO: 0 /100 WBCS (ref 0–0)
PLATELET # BLD AUTO: 219 X10*3/UL (ref 150–450)
POTASSIUM SERPL-SCNC: 4.4 MMOL/L (ref 3.5–5.3)
PROT SERPL-MCNC: 6.3 G/DL (ref 6.4–8.2)
RBC # BLD AUTO: 3.78 X10*6/UL (ref 4.5–5.9)
SODIUM SERPL-SCNC: 138 MMOL/L (ref 136–145)
WBC # BLD AUTO: 7.6 X10*3/UL (ref 4.4–11.3)

## 2024-04-17 PROCEDURE — 80053 COMPREHEN METABOLIC PANEL: CPT | Performed by: INTERNAL MEDICINE

## 2024-04-17 PROCEDURE — 83735 ASSAY OF MAGNESIUM: CPT

## 2024-04-17 PROCEDURE — 2500000004 HC RX 250 GENERAL PHARMACY W/ HCPCS (ALT 636 FOR OP/ED): Performed by: INTERNAL MEDICINE

## 2024-04-17 PROCEDURE — 99024 POSTOP FOLLOW-UP VISIT: CPT | Performed by: NURSE PRACTITIONER

## 2024-04-17 PROCEDURE — 85027 COMPLETE CBC AUTOMATED: CPT | Performed by: INTERNAL MEDICINE

## 2024-04-17 PROCEDURE — 2500000001 HC RX 250 WO HCPCS SELF ADMINISTERED DRUGS (ALT 637 FOR MEDICARE OP): Performed by: SURGERY

## 2024-04-17 PROCEDURE — 2500000002 HC RX 250 W HCPCS SELF ADMINISTERED DRUGS (ALT 637 FOR MEDICARE OP, ALT 636 FOR OP/ED)

## 2024-04-17 PROCEDURE — 82947 ASSAY GLUCOSE BLOOD QUANT: CPT

## 2024-04-17 PROCEDURE — 2500000004 HC RX 250 GENERAL PHARMACY W/ HCPCS (ALT 636 FOR OP/ED): Mod: JZ | Performed by: SURGERY

## 2024-04-17 PROCEDURE — 99232 SBSQ HOSP IP/OBS MODERATE 35: CPT

## 2024-04-17 PROCEDURE — 36415 COLL VENOUS BLD VENIPUNCTURE: CPT | Performed by: INTERNAL MEDICINE

## 2024-04-17 PROCEDURE — 2500000004 HC RX 250 GENERAL PHARMACY W/ HCPCS (ALT 636 FOR OP/ED)

## 2024-04-17 RX ORDER — MAGNESIUM SULFATE HEPTAHYDRATE 40 MG/ML
2 INJECTION, SOLUTION INTRAVENOUS ONCE
Status: COMPLETED | OUTPATIENT
Start: 2024-04-17 | End: 2024-04-17

## 2024-04-17 RX ORDER — MAGNESIUM SULFATE HEPTAHYDRATE 40 MG/ML
4 INJECTION, SOLUTION INTRAVENOUS ONCE
Status: DISCONTINUED | OUTPATIENT
Start: 2024-04-17 | End: 2024-04-17

## 2024-04-17 RX ADMIN — LISINOPRIL 40 MG: 40 TABLET ORAL at 08:14

## 2024-04-17 RX ADMIN — INSULIN LISPRO 1 UNITS: 100 INJECTION, SOLUTION INTRAVENOUS; SUBCUTANEOUS at 08:26

## 2024-04-17 RX ADMIN — HYDRALAZINE HYDROCHLORIDE 10 MG: 20 INJECTION INTRAMUSCULAR; INTRAVENOUS at 12:22

## 2024-04-17 RX ADMIN — CEFAZOLIN SODIUM 1 G: 1 INJECTION, SOLUTION INTRAVENOUS at 06:08

## 2024-04-17 RX ADMIN — ATORVASTATIN CALCIUM 80 MG: 80 TABLET, FILM COATED ORAL at 08:14

## 2024-04-17 RX ADMIN — AMLODIPINE BESYLATE 5 MG: 5 TABLET ORAL at 08:15

## 2024-04-17 RX ADMIN — MAGNESIUM SULFATE HEPTAHYDRATE 2 G: 40 INJECTION, SOLUTION INTRAVENOUS at 08:13

## 2024-04-17 RX ADMIN — CEFAZOLIN SODIUM 1 G: 1 INJECTION, SOLUTION INTRAVENOUS at 01:26

## 2024-04-17 RX ADMIN — HYDRALAZINE HYDROCHLORIDE 10 MG: 20 INJECTION INTRAMUSCULAR; INTRAVENOUS at 03:04

## 2024-04-17 RX ADMIN — INSULIN LISPRO 1 UNITS: 100 INJECTION, SOLUTION INTRAVENOUS; SUBCUTANEOUS at 13:13

## 2024-04-17 RX ADMIN — METOPROLOL SUCCINATE 200 MG: 50 TABLET, EXTENDED RELEASE ORAL at 08:14

## 2024-04-17 RX ADMIN — ASPIRIN 81 MG CHEWABLE TABLET 81 MG: 81 TABLET CHEWABLE at 08:14

## 2024-04-17 ASSESSMENT — COGNITIVE AND FUNCTIONAL STATUS - GENERAL
WALKING IN HOSPITAL ROOM: A LITTLE
STANDING UP FROM CHAIR USING ARMS: A LITTLE
MOVING TO AND FROM BED TO CHAIR: A LITTLE
TURNING FROM BACK TO SIDE WHILE IN FLAT BAD: A LITTLE
CLIMB 3 TO 5 STEPS WITH RAILING: A LITTLE
MOBILITY SCORE: 19

## 2024-04-17 ASSESSMENT — PAIN - FUNCTIONAL ASSESSMENT
PAIN_FUNCTIONAL_ASSESSMENT: 0-10

## 2024-04-17 ASSESSMENT — PAIN SCALES - GENERAL
PAINLEVEL_OUTOF10: 0 - NO PAIN

## 2024-04-17 NOTE — OP NOTE
RIGHT CAROTID ENDARTECTOMY (R) Operative Note     Date: 2024  OR Location: [facility] OR    Name: Harsha Herring, : 1950, Age: 73 y.o., MRN: [id number], Sex: male    Diagnosis  Pre-op Diagnosis     * Stenosis of right carotid artery [I65.21] Post-op Diagnosis     * Stenosis of right carotid artery [I65.21]     Procedures  RIGHT CAROTID ENDARTECTOMY  65728 - VT TEAEC W/PATCH GRF CAROTID VERTB SUBCLAV NECK INC      Surgeons      * Spenser Moreno - Primary    Resident/Fellow/Other Assistant:  Surgeons and Role:  * No surgeons found with a matching role *    Procedure Summary  Anesthesia: General  ASA: III  Anesthesia Staff: Anesthesiologist: Javier Quintero MD; Edi Moser MD  CRNA: TEMO De Los Santos-CRNA  Estimated Blood Loss: 75mL  Intra-op Medications: * Intraprocedure medication information is unavailable because the case start and end events have not been set *           Anesthesia Record               Intraprocedure I/O Totals          Intake    Phenylephrine Drip 0.00 mL    The total shown is the total volume documented since Anesthesia Start was filed.    sodium chloride 0.9% infusion 1200.00 mL    Total Intake 1200 mL       Output    Est. Blood Loss 30 mL    Total Output 30 mL       Net    Net Volume 1170 mL          Specimen:   ID Type Source Tests Collected by Time   1 : RIGHT CAROTID LYMPH NODE Tissue LYMPH NODE EXCISION SURGICAL PATHOLOGY EXAM Spenser Moreno, DO 2024 1350   2 : RIGHT CAROTID PLAQUE Tissue PLAQUE SURGICAL PATHOLOGY EXAM Spenser TAYLOR Josh, DO 2024 1433        Staff:   Circulator: Shasha Mcgee RN  Relief Circulator: Alee Sanchez RN  Scrub Person: Mj Jacome         Drains and/or Catheters: * None in log *    Tourniquet Times:         Implants:  Implants       Type Name Action Serial No.      Implant GRAFT, XENOSURE, BIOLOGIC PATCH, 0.8CM X 8CM - [id number] Implanted               Findings:     Indications: Harsha Herring is an 73 y.o. male who is having  surgery for Stenosis of right carotid artery [I65.21].     The patient was seen in the preoperative area. The risks, benefits, complications, treatment options, non-operative alternatives, expected recovery and outcomes were discussed with the patient. The possibilities of reaction to medication, pulmonary aspiration, injury to surrounding structures, bleeding, recurrent infection, the need for additional procedures, failure to diagnose a condition, and creating a complication requiring transfusion or operation were discussed with the patient. The patient concurred with the proposed plan, giving informed consent.  The site of surgery was properly noted/marked if necessary per policy. The patient has been actively warmed in preoperative area. Preoperative antibiotics have been ordered and given within 1 hours of incision. Venous thrombosis prophylaxis have been ordered including bilateral sequential compression devices    Procedure Details: After the procedure was fully explained to the patient after adequate consent form was signed, the patient had an IV and received preoperative IV antibiotics.  The patient was then taken operating room placed in supine position was given sedation.  An art line was placed by anesthesia.  The patient was then given a general acetic agent and was intubated.  The entire right neck and chest region was cleaned and prepped in usual sterile fashion.  Using a scalpel, a longitudinal incision was made along the medial border of the sternocleidomastoid muscle.  Dissection was carried down through subcutaneous tissue and through the platysma using cautery.  The right common carotid artery was identified circumferentially dissected free and controlled using a vessel loop.  The internal carotid artery well beyond the bifurcation was circumferentially dissected free and controlled using a vessel loop.  The origin of the external carotid artery was circumferentially dissected free and controlled  using a vessel loop.  The patient was given 6000 units of heparin.  After adequate heparinization time, the internal carotid artery was clamped and then the common carotid artery and then the external carotid artery.  Using a scalpel a longitudinal arteriotomy was made in the common carotid artery and this was extended using Ibrahim scissors through a very very high-grade stenosis at the origin of the internal carotid artery until normal luminal diameter was identified.  A shunt was then placed within the internal carotid artery and held in place using a clamp.  The shunt was then placed within the common carotid artery and this was held in place using a clamp.  Using a elevator, an endarterectomy was then started in the common carotid artery.  An eversion endarterectomy of the external carotid artery was performed and a feathering endarterectomy of the internal carotid artery was performed.  A small edge of intima was identified which was tacked down using 4 interrupted sutures of 7-0 Prolene.  At this time, I decided that a patch was not necessary because of the size of the vessels.  Using a 6-0 Prolene, a primary closure was then performed.  With a small defect remaining, the shunt was then clamped and removed from the internal carotid artery which was allowed to backbleed for few seconds and then clamped.  The shunt was then removed from the common carotid artery this was burped and then clamped.  The lumen was then flushed with heparinized saline.  The remaining defect was then closed using running suture of 6-0 Prolene.  The internal carotid artery was then removed and placed at its origin.  The external carotid artery clamp was removed and then the common carotid artery clamp was removed.  After 10 heartbeats the internal carotid artery clamp was removed.  Using a Doppler, excellent flow signals were identified in all 3 major arteries.  There was an area along the suture line that had a small amount of bleeding  that was repaired using 7-0 Prolene.  Hemostasis was achieved using thrombin-soaked Gelfoam.  After hemostasis was achieved, the wound was copiously irrigated with antibiotic solution.  The platysma was then closed using inverted interrupted sutures of 3-0 Vicryl.  The skin was then closed using a running subcuticular stitch of 4-0 Vicryl.  Telfa Tegaderm was applied.  The patient tolerated the procedure well.  No complications noted.  Instrument count needle count sponge counts correct.  Thank you very much this ends dictation  Complications:  None; patient tolerated the procedure well.    Disposition: PACU - hemodynamically stable.  Condition: stable         Additional Details:     Attending Attestation: I was present and scrubbed for the entire procedure.    Spenser Moreno  Phone Number: [phone]

## 2024-04-17 NOTE — PROGRESS NOTES
TCC introduced self and explained role. Demographics verified. Patient from home.  Independent PTA. No skilled needs identified at this time. RN/ Patient confirms no home going needs. Please contact Care Transitions Team if needs arise.    ARMIN MCGINNIS, KASHMIR TCC

## 2024-04-17 NOTE — PROGRESS NOTES
"Critical Care Progress Note    Patient Name: Harsha Herring   YOB: 1950    Subjective:  Patient doing well. Denies any chest pain, fevers, chills. Patient endorses improved neck pain at incision site.    Objective:    /73   Pulse 52   Temp 36.4 °C (97.5 °F) (Temporal)   Resp 16   Ht 1.702 m (5' 7.01\")   Wt 80 kg (176 lb 5.9 oz)   SpO2 97%   BMI 27.62 kg/m²     Physical Exam:  GEN: conversant, NAD  HEENT: PERRL, EOMI, MMM OP clear, TMs clear bilaterally  NECK: Right incision wound  CV: S1, S2, regular, no murmur  PULM: CTAB  ABD: soft, NT, ND, BS+  EXT: no LE edema, right arterial line  NEURO: no gross focal deficits  PSYCH: appropriate affect     Assessment/Plan:  Harsha Herring is a 73 y.o. male with past medical history of T2DM, HTN, HLD, CVA with residual left sided weakness who presented to [facility] on 4/15/24 for right carotid endarterectomy with Dr. Moreno. ICU was consulted for medical management and blood pressure monitoring.     Neuro:  - Patient orientated to person place and time  - Monitor for ICU delirium  - Patient requires no sedation or analgesia  - Maintain sleep hygiene  - Pain: Norco Q4 PRN moderate     Cardiovascular  #Right endarterectomy 4/16  - Maintain MAPs>65  - Main SBP < 140, PRN hydralazine  - Continue home meds: amlodipine 5mg, lisinopril 40mg, metoprolol succinate 200mg daily     Pulmonary:  - On RA, no acute issues     GI:  - No acute issues, cardiac diet     Renal:   - Monitor electrolytes     Endocrine:  - Hold metformin, lispro sliding scale     Heme/Onc:  - Monitor for signs of bleeding     ID:  - Perioperative antibiotics per primary team     ICU CHECK LIST:   Antimicrobials: None  Oxygen: RA  Feeding: Cardiac Diet  Fluids: None  Analgesia: Norco  Sedation: None  Thromboprophylaxis: None  Ulcer prophylaxis: None  Glycemic control: ISS  Bowel care: None  Indwelling catheters: None  Lines: PIV    Dispo: Clear from Critical Care standpoint for discharge. " Discharge per primary/Vascular team.     Code Status: Full Code        Ray Persaud MD  Critical Care

## 2024-04-17 NOTE — DISCHARGE SUMMARY
Discharge Diagnosis  Stenosis of right carotid artery    Issues Requiring Follow-Up  Status post right carotid endarterectomy performed on 4/16/2024     Test Results Pending At Discharge  Pending Labs       Order Current Status    Surgical Pathology Exam In process            Hospital Course   On 4/16/2024 came to [facility] for elective surgery on his right carotid stenosis.  Patient had a right carotid endarterectomy performed by Dr. Moreno on 4/16/2024 postoperatively patient was transferred to PACU to recover.  Patient was then transferred to the heart center for further medical and hemodynamic management.  Patient's PMHx and PSHx includes right hemispheric CVA in 2018 with residual right-sided weakness, right carotid stenosis, chronic pain in shoulders, hypertension, HLD, DM2, inguinal hernia with hernia repair  Postop day #1 arterial mean line was discontinued, and Muro.  IV fluids were discontinued patient was given supplemental magnesium due to hypomagnesia.  Patient was taking p.o.'s well, voiding without issue, and able to ambulate.  Patient is passing flatus and denies any nausea or vomiting.  Right neck dressing was changed.  Patient denies any neurological symptoms, negative for headache.  Incisional pain was tolerant.  Patient's lab work revealed WBCs of 7.6 H&H is 12.3 and 37.1 platelets are 219 creatinine and BUN are 1.26/29.  Patient was stable and was hemodynamically and neurovascularly stable evaluated later by Dr. Moreno.  Patient will be discharged home.    Pertinent Physical Exam At Time of Discharge  Physical Exam  Constitutional: Well developed , awake/alert/oriented x3, in no distress,  Eyes: Clear sclera  ENMT: mucous membranes are moist, no apparent injury, no lesions seen,   Head/neck: Neck supple, trachea  is midline, right neck dressing small amount of strikethrough bleeding, neck is negative for hematoma or swelling.  No bruits, no mass, no stridor.  Numbness small area of jaw right side by  incision site.  Respiratory/thorax: Breath sounds clear and equal bilaterally with good chest expansion, thorax symmetric  Cardiac/Vascular: Regular, rate and rhythm, no murmurs, 2+ radial pulses, palpable posterior PTs  Gastrointestinal: Nondistended soft nontender, positive bowel sounds, no bruits.  Musculoskeletal: Moves all extremities, limited range of motion , no joint swelling,   Extremities: No cyanosis, no contusions or wounds,   Neurological: Alert and oriented x3, left arm hand grasp weaker than right this is his baseline.  Cranial nerves II through XII grossly intact. No focal deficits, face symmetric, no facial drooping or tongue deviation, or dysarthria, normal strength,  sensation intact. Hand grasps unequal, push pulls unequal with left slightly weaker than right .  Same as baseline  Lymphatic: No significant lymphadenopathy  Skin: Warm and dry, no lesions, no rashes  Psychological: Appropriate mood and behavior  Home Medications     Medication List      CONTINUE taking these medications     amLODIPine 5 mg tablet; Commonly known as: Norvasc; Take 1 tablet (5 mg)   by mouth once daily.   aspirin 81 mg chewable tablet   atorvastatin 80 mg tablet; Commonly known as: Lipitor; Take 1 tablet (80   mg) by mouth once daily.   glipiZIDE XL 5 mg 24 hr tablet; Commonly known as: Glucotrol XL; TAKE 1   TABLET DAILY (NEED APPOINTMENT FOR FURTHER REFILLS. CALL TODAY FOR   APPOINTMENT)   lisinopril 40 mg tablet; Take 1 tablet (40 mg) by mouth once daily.   metFORMIN 1,000 mg tablet; Commonly known as: Glucophage; Take 1 tablet   (1,000 mg) by mouth 2 times a day.   nebivolol 20 mg tablet; Commonly known as: Bystolic; Take 1 tablet (20   mg) by mouth once daily.   OneTouch Verio test strips strip; Generic drug: blood sugar diagnostic;   1 strip by subdermal route once daily.   Toujeo SoloStar U-300 Insulin 300 unit/mL (1.5 mL) injection; Generic   drug: insulin glargine; INJECT 15 UNITS SUBCUTANEOUSLY DAILY        Outpatient Follow-Up  Future Appointments   Date Time Provider Department Center   5/7/2024  9:30 AM Michael Frazier DO [id number] [place]       Dwayne Falcon, APRN-CNP

## 2024-04-17 NOTE — CARE PLAN
Problem: Pain  Goal: My pain/discomfort is manageable  Outcome: Progressing     Problem: Safety  Goal: Patient will be injury free during hospitalization  Outcome: Progressing  Goal: I will remain free of falls  Outcome: Progressing     Problem: Daily Care  Goal: Daily care needs are met  Outcome: Progressing     Problem: Psychosocial Needs  Goal: Demonstrates ability to cope with hospitalization/illness  Outcome: Progressing  Goal: Collaborate with me, my family, and caregiver to identify my specific goals  Outcome: Progressing     Problem: Discharge Barriers  Goal: My discharge needs are met  Outcome: Progressing   The patient's goals for the shift include  no pain by end of shift    The clinical goals for the shift include Patient will remain hemodynamically stable

## 2024-04-17 NOTE — PROGRESS NOTES
Attempted bedside visit for Care Coordination assessment and discharge planning. Another service at bedside. Will re-attempt assessment as able.      ARMIN MCGINNIS RN TCC

## 2024-04-17 NOTE — DISCHARGE INSTRUCTIONS
May take over-the-counter medication for pain management   or prescribed pain medication as directed.   Use cold compresses with barrier 20minutes on 20 minutes off.  Avoid sleeping on incision.  May shower 3 days after surgery, no scrubbing of incision site ,rinse soap off gently from incision site, and pat dry.  Do not scrub incision site.  Remove dressing tomorrow if present.  Seek medical attention with onset of headache unrelieved with over-the-counter medication.  Do not apply any lotions or oils to incision site.  May use cold compresses for pain and swelling, a barrier with compresses should be used do not directly apply ice to skin.   May use laxatives, stool softeners, or a high-fiber diet to prevent constipation.  Avoid becoming constipated.  Avoid straining.  Avoid holding your breath especially during bowel movements.  Avoid holding your breath during activities  Increase your activity each day.   Ambulate stay active.  Keep head elevated as much as possible this will reduce swelling.  Avoid forward bending position with head down.  Do not lift anything more than 10 pounds.  No driving for 10 days minimum.  Continue home medications as ordered.  If you smoke. Stop smoking!  Seek medical attention with onset of headache unrelieved with over-the-counter medication.Monitor incision for signs of increased swelling, bleeding, redness, drainage, also monitor for fevers, chills, headaches, one-sided weakness, new balance problems, numbness or tingling and seek emergent medical attention or call the office.Call office [phone] after discharge and make a follow-up appointment to occur in 10-14 days post operatively.

## 2024-04-17 NOTE — PROGRESS NOTES
"Harsha Herring is a 73 y.o. male on day 1 of admission presenting with Stenosis of right carotid artery.    Subjective   Patient is status post right carotid endarterectomy.  I examined this patient at bedside spouse was present during examination.  Patient's neuro is at baseline.  Patient has minimal pain and discomfort from incision site.         Objective     Vitals:    04/17/24 0900   BP:    Pulse:    Resp: 16   Temp:    SpO2:       Physical Exam  Constitutional: Well developed , awake/alert/oriented x3, in no distress,  Eyes: Clear sclera  ENMT: mucous membranes are moist, no apparent injury, no lesions seen,   Head/neck: Neck supple, trachea  is midline, right neck dressing small amount of strikethrough bleeding, neck is negative for hematoma or swelling.  No bruits, no mass, no stridor.  Numbness small area of jaw right side by incision site.  Respiratory/thorax: Breath sounds clear and equal bilaterally with good chest expansion, thorax symmetric  Cardiac/Vascular: Regular, rate and rhythm, no murmurs, 2+ radial pulses, palpable posterior PTs  Gastrointestinal: Nondistended soft nontender, positive bowel sounds, no bruits.  Musculoskeletal: Moves all extremities, limited range of motion , no joint swelling,   Extremities: No cyanosis, no contusions or wounds,   Neurological: Alert and oriented x3, left arm hand grasp weaker than right this is his baseline.  Cranial nerves II through XII grossly intact. No focal deficits, face symmetric, no facial drooping or tongue deviation, or dysarthria, normal strength,  sensation intact. Hand grasps unequal, push pulls unequal with left slightly weaker than right .  Same as baseline  Lymphatic: No significant lymphadenopathy  Skin: Warm and dry, no lesions, no rashes  Psychological: Appropriate mood and behavior  Blood pressure 140/65, pulse 53, temperature 36.4 °C (97.5 °F), temperature source Temporal, resp. rate 16, height 1.702 m (5' 7.01\"), weight 80 kg (176 lb " 5.9 oz), SpO2 98%.        Intake/Output last 3 Shifts:  I/O last 3 completed shifts:  In: 1540 (19.3 mL/kg) [P.O.:240; I.V.:1200 (15 mL/kg); IV Piggyback:100]  Out: 380 (4.8 mL/kg) [Urine:350 (0.1 mL/kg/hr); Blood:30]  Weight: 80 kg     Patient Active Problem List    Diagnosis Date Noted    S/P carotid endarterectomy 04/17/2024    Stenosis of right carotid artery 07/24/2023    Chronic pain of both shoulders 07/24/2023    Benign essential hypertension 07/24/2023    CVA (cerebral vascular accident) (Multi) 07/24/2023    Gout 07/24/2023    Type 2 diabetes mellitus (Multi) 07/24/2023    Pure hypercholesterolemia 07/24/2023    Diabetes mellitus screening 07/24/2023    Screening for prostate cancer 07/24/2023    Routine general medical examination at health care facility 07/24/2023          Current Facility-Administered Medications:     amLODIPine (Norvasc) tablet 5 mg, 5 mg, oral, Daily, Spenser Moreno DO, 5 mg at 04/17/24 0815    aspirin chewable tablet 81 mg, 81 mg, oral, Daily, Spenser Moreno DO, 81 mg at 04/17/24 0814    atorvastatin (Lipitor) tablet 80 mg, 80 mg, oral, Daily, Spenser Moreno DO, 80 mg at 04/17/24 0814    dextrose 50 % injection 12.5 g, 12.5 g, intravenous, q15 min PRN, Ray Persaud MD    dextrose 50 % injection 25 g, 25 g, intravenous, q15 min PRN, Ray Persaud MD    glucagon (Glucagen) injection 1 mg, 1 mg, intramuscular, q15 min PRN, Ray Persaud MD    glucagon (Glucagen) injection 1 mg, 1 mg, intramuscular, q15 min PRN, Ray Persaud MD    hydrALAZINE (Apresoline) injection 10 mg, 10 mg, intravenous, q4h PRN, Ray Persaud MD, 10 mg at 04/17/24 0304    HYDROcodone-acetaminophen (Norco) 5-325 mg per tablet 1 tablet, 1 tablet, oral, q4h PRN, Spenser Moreno DO    insulin lispro (HumaLOG) injection 0-5 Units, 0-5 Units, subcutaneous, TID with meals, Ray Persaud MD, 1 Units at 04/17/24 0826    lisinopril tablet 40 mg, 40 mg, oral, Daily, Spenser Moreno DO, 40 mg at 04/17/24 0814    metoprolol  succinate XL (Toprol-XL) 24 hr tablet 200 mg, 200 mg, oral, Daily, Spenser Moreno, DO, 200 mg at 04/17/24 0814     Lab Results   Component Value Date    WBC 7.6 04/17/2024    HGB 12.3 (L) 04/17/2024    HCT 37.1 (L) 04/17/2024     04/17/2024    CHOL 278 (H) 02/06/2024    TRIG 282 (H) 02/06/2024    HDL 49.2 02/06/2024    ALT 13 04/17/2024    AST 11 04/17/2024     04/17/2024    K 4.4 04/17/2024     04/17/2024    CREATININE 1.26 04/17/2024    BUN 21 04/17/2024    CO2 22 04/17/2024    PSA 0.61 07/10/2019    HGBA1C 6.3 (H) 02/06/2024       No results found.              Assessment/Plan   Principal Problem:    Stenosis of right carotid artery  Active Problems:    S/P carotid endarterectomy      POD #1 status post right carotid endarterectomy.  Arterial mean line has been discontinued patient for the most part BP has been below 140 mmHg, heart rate stable currently in the 60s and 70s was bradycardic postoperatively.  Patient voiding passing flatus.  Right neck dressing dry and intact slight small areas of strikethrough bleeding.  Neck is negative for hematoma, ecchymosis, swelling.  Patient taking p.o.'s well.  Patient hemodynamically stable will plan for him to ambulate in the hallway, start incentive spirometry.  Will discuss case with Dr. Moreno and intensivist team.if the patient remains stable plan for discharge this afternoon.    Thank you very much for allowing Vascular Surgery to be involved in the care of your patient sincerely Dwayne PLASCENCIA-CNP .  (This note was generated with voice recognition software and may contain errors including spelling, grammar, syntax and missed recognition of what was dictated, of which may not have been fully corrected)      TEMO Barrera-CNP

## 2024-04-18 PROCEDURE — 93005 ELECTROCARDIOGRAM TRACING: CPT

## 2024-04-19 NOTE — ANESTHESIA POSTPROCEDURE EVALUATION
Patient: Harsha Herring    Procedure Summary       Date: 04/16/24 Room / Location: [facility] OR 12 / Virtual [facility] OR    Anesthesia Start: 1315 Anesthesia Stop: 1545    Procedure: RIGHT CAROTID ENDARTECTOMY (Right) Diagnosis:       Stenosis of right carotid artery      (Stenosis of right carotid artery [I65.21])    Surgeons: Spenser Moreno DO Responsible Provider: ONEIL De Los Santos    Anesthesia Type: general ASA Status: 3            Anesthesia Type: general    Vitals Value Taken Time   /71 04/16/24 1645   Temp 36.2 °C (97.2 °F) 04/16/24 1700   Pulse 52 04/16/24 1704   Resp 17 04/16/24 1704   SpO2 98 % 04/16/24 1704   Vitals shown include unfiled device data.    Anesthesia Post Evaluation    Patient location during evaluation: PACU  Patient participation: complete - patient participated  Level of consciousness: awake and alert  Pain management: satisfactory to patient  Airway patency: patent  Cardiovascular status: acceptable  Respiratory status: acceptable  Hydration status: acceptable  Postoperative Nausea and Vomiting: none        No notable events documented.

## 2024-04-22 LAB
LABORATORY COMMENT REPORT: NORMAL
PATH REPORT.FINAL DX SPEC: NORMAL
PATH REPORT.GROSS SPEC: NORMAL
PATH REPORT.RELEVANT HX SPEC: NORMAL
PATH REPORT.TOTAL CANCER: NORMAL

## 2024-04-23 DIAGNOSIS — E11.9 TYPE 2 DIABETES MELLITUS WITHOUT COMPLICATION, WITHOUT LONG-TERM CURRENT USE OF INSULIN (MULTI): ICD-10-CM

## 2024-04-24 ENCOUNTER — APPOINTMENT (OUTPATIENT)
Dept: VASCULAR SURGERY | Facility: CLINIC | Age: 74
End: 2024-04-24
Payer: COMMERCIAL

## 2024-04-26 RX ORDER — ATORVASTATIN CALCIUM 80 MG/1
80 TABLET, FILM COATED ORAL DAILY
Qty: 90 TABLET | Refills: 0 | Status: SHIPPED | OUTPATIENT
Start: 2024-04-26

## 2024-04-30 ASSESSMENT — ENCOUNTER SYMPTOMS
HYPERTENSION: 1
NECK PAIN: 0
SHORTNESS OF BREATH: 0
ORTHOPNEA: 0
PALPITATIONS: 0
HEADACHES: 0
SWEATS: 0
PND: 0
BLURRED VISION: 1

## 2024-05-01 ENCOUNTER — APPOINTMENT (OUTPATIENT)
Dept: VASCULAR SURGERY | Facility: CLINIC | Age: 74
End: 2024-05-01
Payer: COMMERCIAL

## 2024-05-02 ENCOUNTER — OFFICE VISIT (OUTPATIENT)
Dept: VASCULAR SURGERY | Facility: CLINIC | Age: 74
End: 2024-05-02
Payer: COMMERCIAL

## 2024-05-02 VITALS
HEIGHT: 67 IN | HEART RATE: 52 BPM | BODY MASS INDEX: 27.62 KG/M2 | SYSTOLIC BLOOD PRESSURE: 140 MMHG | WEIGHT: 176 LBS | DIASTOLIC BLOOD PRESSURE: 70 MMHG | OXYGEN SATURATION: 96 %

## 2024-05-02 DIAGNOSIS — I65.21 STENOSIS OF RIGHT CAROTID ARTERY: ICD-10-CM

## 2024-05-02 PROCEDURE — 1111F DSCHRG MED/CURRENT MED MERGE: CPT | Performed by: SURGERY

## 2024-05-02 PROCEDURE — 1160F RVW MEDS BY RX/DR IN RCRD: CPT | Performed by: SURGERY

## 2024-05-02 PROCEDURE — 3062F POS MACROALBUMINURIA REV: CPT | Performed by: SURGERY

## 2024-05-02 PROCEDURE — 99024 POSTOP FOLLOW-UP VISIT: CPT | Performed by: SURGERY

## 2024-05-02 PROCEDURE — 3078F DIAST BP <80 MM HG: CPT | Performed by: SURGERY

## 2024-05-02 PROCEDURE — 3077F SYST BP >= 140 MM HG: CPT | Performed by: SURGERY

## 2024-05-02 PROCEDURE — 3044F HG A1C LEVEL LT 7.0%: CPT | Performed by: SURGERY

## 2024-05-02 PROCEDURE — 1159F MED LIST DOCD IN RCRD: CPT | Performed by: SURGERY

## 2024-05-02 PROCEDURE — 3050F LDL-C >= 130 MG/DL: CPT | Performed by: SURGERY

## 2024-05-02 PROCEDURE — 4010F ACE/ARB THERAPY RXD/TAKEN: CPT | Performed by: SURGERY

## 2024-05-05 DIAGNOSIS — E11.9 TYPE 2 DIABETES MELLITUS WITHOUT COMPLICATION, UNSPECIFIED WHETHER LONG TERM INSULIN USE (MULTI): ICD-10-CM

## 2024-05-07 ENCOUNTER — OFFICE VISIT (OUTPATIENT)
Dept: PRIMARY CARE | Facility: CLINIC | Age: 74
End: 2024-05-07
Payer: COMMERCIAL

## 2024-05-07 ENCOUNTER — LAB (OUTPATIENT)
Dept: LAB | Facility: LAB | Age: 74
End: 2024-05-07
Payer: COMMERCIAL

## 2024-05-07 VITALS
SYSTOLIC BLOOD PRESSURE: 150 MMHG | BODY MASS INDEX: 27.31 KG/M2 | DIASTOLIC BLOOD PRESSURE: 70 MMHG | HEIGHT: 67 IN | WEIGHT: 174 LBS

## 2024-05-07 DIAGNOSIS — I10 BENIGN ESSENTIAL HYPERTENSION: ICD-10-CM

## 2024-05-07 DIAGNOSIS — E11.9 TYPE 2 DIABETES MELLITUS WITHOUT COMPLICATION, WITHOUT LONG-TERM CURRENT USE OF INSULIN (MULTI): Primary | ICD-10-CM

## 2024-05-07 DIAGNOSIS — E11.9 TYPE 2 DIABETES MELLITUS WITHOUT COMPLICATION, WITHOUT LONG-TERM CURRENT USE OF INSULIN (MULTI): ICD-10-CM

## 2024-05-07 LAB
ALBUMIN SERPL BCP-MCNC: 4.5 G/DL (ref 3.4–5)
ALP SERPL-CCNC: 78 U/L (ref 33–136)
ALT SERPL W P-5'-P-CCNC: 21 U/L (ref 10–52)
ANION GAP SERPL CALC-SCNC: 19 MMOL/L (ref 10–20)
AST SERPL W P-5'-P-CCNC: 17 U/L (ref 9–39)
BILIRUB SERPL-MCNC: 0.6 MG/DL (ref 0–1.2)
BUN SERPL-MCNC: 24 MG/DL (ref 6–23)
CALCIUM SERPL-MCNC: 10.6 MG/DL (ref 8.6–10.6)
CHLORIDE SERPL-SCNC: 99 MMOL/L (ref 98–107)
CO2 SERPL-SCNC: 23 MMOL/L (ref 21–32)
CREAT SERPL-MCNC: 1.27 MG/DL (ref 0.5–1.3)
EGFRCR SERPLBLD CKD-EPI 2021: 60 ML/MIN/1.73M*2
EST. AVERAGE GLUCOSE BLD GHB EST-MCNC: 140 MG/DL
GLUCOSE SERPL-MCNC: 116 MG/DL (ref 74–99)
HBA1C MFR BLD: 6.5 %
POTASSIUM SERPL-SCNC: 4.8 MMOL/L (ref 3.5–5.3)
PROT SERPL-MCNC: 7.2 G/DL (ref 6.4–8.2)
SODIUM SERPL-SCNC: 136 MMOL/L (ref 136–145)

## 2024-05-07 PROCEDURE — 3050F LDL-C >= 130 MG/DL: CPT | Performed by: STUDENT IN AN ORGANIZED HEALTH CARE EDUCATION/TRAINING PROGRAM

## 2024-05-07 PROCEDURE — 99214 OFFICE O/P EST MOD 30 MIN: CPT | Performed by: STUDENT IN AN ORGANIZED HEALTH CARE EDUCATION/TRAINING PROGRAM

## 2024-05-07 PROCEDURE — 3078F DIAST BP <80 MM HG: CPT | Performed by: STUDENT IN AN ORGANIZED HEALTH CARE EDUCATION/TRAINING PROGRAM

## 2024-05-07 PROCEDURE — 1160F RVW MEDS BY RX/DR IN RCRD: CPT | Performed by: STUDENT IN AN ORGANIZED HEALTH CARE EDUCATION/TRAINING PROGRAM

## 2024-05-07 PROCEDURE — 36415 COLL VENOUS BLD VENIPUNCTURE: CPT

## 2024-05-07 PROCEDURE — 4010F ACE/ARB THERAPY RXD/TAKEN: CPT | Performed by: STUDENT IN AN ORGANIZED HEALTH CARE EDUCATION/TRAINING PROGRAM

## 2024-05-07 PROCEDURE — 3044F HG A1C LEVEL LT 7.0%: CPT | Performed by: STUDENT IN AN ORGANIZED HEALTH CARE EDUCATION/TRAINING PROGRAM

## 2024-05-07 PROCEDURE — 80053 COMPREHEN METABOLIC PANEL: CPT

## 2024-05-07 PROCEDURE — 3077F SYST BP >= 140 MM HG: CPT | Performed by: STUDENT IN AN ORGANIZED HEALTH CARE EDUCATION/TRAINING PROGRAM

## 2024-05-07 PROCEDURE — 1111F DSCHRG MED/CURRENT MED MERGE: CPT | Performed by: STUDENT IN AN ORGANIZED HEALTH CARE EDUCATION/TRAINING PROGRAM

## 2024-05-07 PROCEDURE — 1159F MED LIST DOCD IN RCRD: CPT | Performed by: STUDENT IN AN ORGANIZED HEALTH CARE EDUCATION/TRAINING PROGRAM

## 2024-05-07 PROCEDURE — 1036F TOBACCO NON-USER: CPT | Performed by: STUDENT IN AN ORGANIZED HEALTH CARE EDUCATION/TRAINING PROGRAM

## 2024-05-07 PROCEDURE — 3062F POS MACROALBUMINURIA REV: CPT | Performed by: STUDENT IN AN ORGANIZED HEALTH CARE EDUCATION/TRAINING PROGRAM

## 2024-05-07 PROCEDURE — G2211 COMPLEX E/M VISIT ADD ON: HCPCS | Performed by: STUDENT IN AN ORGANIZED HEALTH CARE EDUCATION/TRAINING PROGRAM

## 2024-05-07 PROCEDURE — 83036 HEMOGLOBIN GLYCOSYLATED A1C: CPT

## 2024-05-07 RX ORDER — AMLODIPINE BESYLATE 10 MG/1
10 TABLET ORAL DAILY
Qty: 90 TABLET | Refills: 1 | Status: SHIPPED | OUTPATIENT
Start: 2024-05-07 | End: 2024-11-03

## 2024-05-07 ASSESSMENT — ENCOUNTER SYMPTOMS
PALPITATIONS: 0
JOINT SWELLING: 0
DYSURIA: 0
VOMITING: 0
HEMATURIA: 0
COUGH: 0
SHORTNESS OF BREATH: 0
DIARRHEA: 0
SEIZURES: 0
ABDOMINAL PAIN: 0
FEVER: 0
CHILLS: 0
EYE PAIN: 0

## 2024-05-07 NOTE — PROGRESS NOTES
"Subjective   Patient ID: Harsha Herring is a 73 y.o. male who presents for Follow-up.    Patient here for follow up. He has been recovering well from his carotid endarterectomy done last month. He does not check his blood pressure at home very often, last was before his follow up visit with Dr. Moreno last week. At home that day, his SBP was 148.     Review of Systems   Constitutional:  Negative for chills and fever.   HENT:  Negative for ear pain.    Eyes:  Negative for pain.   Respiratory:  Negative for cough and shortness of breath.    Cardiovascular:  Negative for chest pain and palpitations.   Gastrointestinal:  Negative for abdominal pain, diarrhea and vomiting.   Genitourinary:  Negative for dysuria and hematuria.   Musculoskeletal:  Negative for joint swelling.   Skin:  Negative for rash.   Neurological:  Negative for seizures and syncope.   All other systems reviewed and are negative.      Objective   /71   Ht 1.702 m (5' 7\")   Wt 78.9 kg (174 lb)   BMI 27.25 kg/m²     Physical Exam  Vitals and nursing note reviewed.   Constitutional:       General: He is not in acute distress.     Appearance: He is not ill-appearing or toxic-appearing.   HENT:      Head: Normocephalic and atraumatic.      Right Ear: External ear normal.      Left Ear: External ear normal.      Mouth/Throat:      Mouth: Mucous membranes are moist.   Eyes:      Conjunctiva/sclera: Conjunctivae normal.   Cardiovascular:      Rate and Rhythm: Normal rate and regular rhythm.      Heart sounds: Normal heart sounds.   Pulmonary:      Effort: Pulmonary effort is normal.      Breath sounds: Normal breath sounds.   Abdominal:      General: There is no distension.      Palpations: Abdomen is soft.      Tenderness: There is no abdominal tenderness. There is no guarding.   Musculoskeletal:      Cervical back: Neck supple.      Right lower leg: No edema.      Left lower leg: No edema.   Skin:     General: Skin is warm and dry.   Neurological: "      General: No focal deficit present.      Mental Status: He is alert and oriented to person, place, and time.   Psychiatric:         Behavior: Behavior normal.         Assessment/Plan   #HTN  -Continue nebivolol and lisinopril  -BP elevated. Increase amlodipine to 10 mg daily      #HLD  -Continue atorvastatin     #T2DM  -Last A1c 6.3 (2024). Continue toujeo 10 units daily, glipizide, metformin.   -Recommend annual eye and foot exam.  -Last urine albumin creatinine ratio 1,566 (2024)     #History of CVA   #Carotid stenosis s/p right   -Continue aspirin, statin     #Health maintenance  -PSA normal 10/2023  -Reports negative cologuard .   -Up to date on pneumonia vaccine     RTC in 3-6 months    Pt seen with resident physician, uptitrate amlodipine due to high BP, check lab work today, BS well controlled, continue current meds, fu with specialistsAnswers submitted by the patient for this visit:  High Blood Pressure Questionnaire (Submitted on 2024)  Chief Complaint: Hypertension  Chronicity: recurrent  Progression since onset: waxing and waning  Condition status: resistant  anxiety: No  blurred vision: Yes  malaise/fatigue: Yes  orthopnea: No  peripheral edema: No  PND: No  sweats: No  Agents associated with hypertension: no associated agents  CAD risks: diabetes mellitus  Compliance problems: no compliance problems  Answers submitted by the patient for this visit:  High Blood Pressure Questionnaire (Submitted on 2024)  Chief Complaint: Hypertension  Chronicity: recurrent  Progression since onset: waxing and waning  Condition status: resistant  anxiety: No  blurred vision: Yes  malaise/fatigue: Yes  orthopnea: No  peripheral edema: No  PND: No  sweats: No  Agents associated with hypertension: no associated agents  CAD risks: diabetes mellitus  Compliance problems: no compliance problems

## 2024-05-07 NOTE — PROGRESS NOTES
"Subjective   Patient ID: Harsha Herring is a 73 y.o. male who presents for Follow-up.    HPI     Review of Systems    Objective   Ht 1.702 m (5' 7\")   Wt 78.9 kg (174 lb)   BMI 27.25 kg/m²     Physical Exam    Assessment/Plan          "

## 2024-05-07 NOTE — PROGRESS NOTES
"Harsha Herring is a 73 y.o. male     Subjective   This patient presents today for follow-up of his carotid artery disease.  I did do a right carotid endarterectomy on him in mid April last month.  Overall, he states that he is feeling well.  He denies any fever chills nausea vomiting or headache.  He denies any new neurological symptoms.  He did have a fairly significant right-sided CVA a few years ago prior to his endarterectomy done last month.         Objective     Vitals:    05/02/24 0900   BP: 140/70   Pulse: 52   SpO2: 96%      Physical Exam  This patient is alert and oriented x 3.  He is in no acute distress.  Head is normocephalic.  Pupils are equal and reactive to light and accommodation.  Neck is soft and supple without palpable lymph nodes.  Heart is regular rate.  Lungs are clear.  Abdomen soft with positive bowel sounds there is no pain on palpation.  Right upper and right lower extremities have adequate range of motion.  His left upper and left lower extremity has decreased range of motion.  Psychologically he appears to be acting appropriately.  His right neck incision is healing well without issue  Blood pressure 140/70, pulse 52, height 1.702 m (5' 7\"), weight 79.8 kg (176 lb), SpO2 96%.            Patient Active Problem List    Diagnosis Date Noted    S/P carotid endarterectomy 04/17/2024    Stenosis of right carotid artery 07/24/2023    Chronic pain of both shoulders 07/24/2023    Benign essential hypertension 07/24/2023    CVA (cerebral vascular accident) (Multi) 07/24/2023    Gout 07/24/2023    Type 2 diabetes mellitus (Multi) 07/24/2023    Pure hypercholesterolemia 07/24/2023    Diabetes mellitus screening 07/24/2023    Screening for prostate cancer 07/24/2023    Routine general medical examination at health care facility 07/24/2023          Current Outpatient Medications:     amLODIPine (Norvasc) 5 mg tablet, Take 1 tablet (5 mg) by mouth once daily., Disp: 90 tablet, Rfl: 1    aspirin 81 mg " chewable tablet, Chew once daily., Disp: , Rfl:     atorvastatin (Lipitor) 80 mg tablet, TAKE 1 TABLET ONCE DAILY, Disp: 90 tablet, Rfl: 0    blood sugar diagnostic (OneTouch Verio test strips) strip, 1 strip by subdermal route once daily., Disp: 90 strip, Rfl: 0    glipiZIDE XL (Glucotrol XL) 5 mg 24 hr tablet, TAKE 1 TABLET DAILY (NEED APPOINTMENT FOR FURTHER REFILLS. CALL TODAY FOR APPOINTMENT), Disp: 90 tablet, Rfl: 0    lisinopril 40 mg tablet, Take 1 tablet (40 mg) by mouth once daily., Disp: 90 tablet, Rfl: 1    metFORMIN (Glucophage) 1,000 mg tablet, Take 1 tablet (1,000 mg) by mouth 2 times a day., Disp: 180 tablet, Rfl: 1    nebivolol (Bystolic) 20 mg tablet, Take 1 tablet (20 mg) by mouth once daily., Disp: 90 tablet, Rfl: 1    Toujeo SoloStar U-300 Insulin 300 unit/mL (1.5 mL) injection, INJECT 15 UNITS SUBCUTANEOUSLY DAILY, Disp: 1.5 mL, Rfl: 1     Lab Results   Component Value Date    WBC 7.6 04/17/2024    HGB 12.3 (L) 04/17/2024    HCT 37.1 (L) 04/17/2024     04/17/2024    CHOL 278 (H) 02/06/2024    TRIG 282 (H) 02/06/2024    HDL 49.2 02/06/2024    ALT 13 04/17/2024    AST 11 04/17/2024     04/17/2024    K 4.4 04/17/2024     04/17/2024    CREATININE 1.26 04/17/2024    BUN 21 04/17/2024    CO2 22 04/17/2024    PSA 0.61 07/10/2019    HGBA1C 6.3 (H) 02/06/2024       No results found.              Assessment/Plan   Active Problems:  There are no active Hospital Problems.      This patient presents today for his post right carotid endarterectomy that was done mid April last month.  He states that overall he is doing relatively well.  His right neck incision is healing well without issue.  He did have a fairly significant stroke a few years ago involving his right hemisphere.  He has no new neurological changes.  Overall, I am very pleased with his postop visit.  I would like him to obtain a carotid duplex scan in 6 months and follow-up with me      Spenser Moreno, DO

## 2024-05-08 LAB
ATRIAL RATE: 50 BPM
ATRIAL RATE: 52 BPM
DIASTOLIC BLOOD PRESSURE: 58 MMHG
P AXIS: -13 DEGREES
P AXIS: -8 DEGREES
P OFFSET: 179 MS
P OFFSET: 184 MS
P ONSET: 124 MS
P ONSET: 132 MS
PR INTERVAL: 178 MS
PR INTERVAL: 190 MS
Q ONSET: 219 MS
Q ONSET: 221 MS
QRS COUNT: 8 BEATS
QRS COUNT: 9 BEATS
QRS DURATION: 88 MS
QRS DURATION: 92 MS
QT INTERVAL: 436 MS
QT INTERVAL: 454 MS
QTC CALCULATION(BAZETT): 397 MS
QTC CALCULATION(BAZETT): 422 MS
QTC FREDERICIA: 410 MS
QTC FREDERICIA: 432 MS
R AXIS: -7 DEGREES
R AXIS: 30 DEGREES
SYSTOLIC BLOOD PRESSURE: 151 MMHG
T AXIS: 20 DEGREES
T AXIS: 7 DEGREES
T OFFSET: 437 MS
T OFFSET: 448 MS
VENTRICULAR RATE: 50 BPM
VENTRICULAR RATE: 52 BPM

## 2024-05-09 RX ORDER — BLOOD-GLUCOSE METER
1 EACH MISCELLANEOUS DAILY
Qty: 100 STRIP | Refills: 1 | Status: SHIPPED | OUTPATIENT
Start: 2024-05-09

## 2024-06-12 DIAGNOSIS — E11.9 TYPE 2 DIABETES MELLITUS WITHOUT COMPLICATION, UNSPECIFIED WHETHER LONG TERM INSULIN USE (MULTI): ICD-10-CM

## 2024-06-13 RX ORDER — GLIPIZIDE 5 MG/1
TABLET, FILM COATED, EXTENDED RELEASE ORAL
Qty: 90 TABLET | Refills: 0 | Status: SHIPPED | OUTPATIENT
Start: 2024-06-13

## 2024-07-01 DIAGNOSIS — I10 BENIGN ESSENTIAL HYPERTENSION: ICD-10-CM

## 2024-07-02 RX ORDER — LISINOPRIL 40 MG/1
40 TABLET ORAL DAILY
Qty: 90 TABLET | Refills: 1 | Status: SHIPPED | OUTPATIENT
Start: 2024-07-02

## 2024-07-06 DIAGNOSIS — E11.9 TYPE 2 DIABETES MELLITUS WITHOUT COMPLICATION, WITHOUT LONG-TERM CURRENT USE OF INSULIN (MULTI): ICD-10-CM

## 2024-07-06 DIAGNOSIS — I10 BENIGN ESSENTIAL HYPERTENSION: ICD-10-CM

## 2024-07-09 RX ORDER — METFORMIN HYDROCHLORIDE 1000 MG/1
1000 TABLET ORAL 2 TIMES DAILY
Qty: 180 TABLET | Refills: 0 | Status: SHIPPED | OUTPATIENT
Start: 2024-07-09

## 2024-07-09 RX ORDER — ATORVASTATIN CALCIUM 80 MG/1
80 TABLET, FILM COATED ORAL DAILY
Qty: 90 TABLET | Refills: 0 | Status: SHIPPED | OUTPATIENT
Start: 2024-07-09

## 2024-09-11 DIAGNOSIS — E11.9 TYPE 2 DIABETES MELLITUS WITHOUT COMPLICATION, UNSPECIFIED WHETHER LONG TERM INSULIN USE (MULTI): ICD-10-CM

## 2024-09-16 RX ORDER — GLIPIZIDE 5 MG/1
TABLET, FILM COATED, EXTENDED RELEASE ORAL
Qty: 90 TABLET | Refills: 0 | Status: SHIPPED | OUTPATIENT
Start: 2024-09-16

## 2024-09-21 DIAGNOSIS — E11.9 TYPE 2 DIABETES MELLITUS WITHOUT COMPLICATION, WITHOUT LONG-TERM CURRENT USE OF INSULIN (MULTI): ICD-10-CM

## 2024-09-21 DIAGNOSIS — I10 BENIGN ESSENTIAL HYPERTENSION: ICD-10-CM

## 2024-09-23 RX ORDER — INSULIN GLARGINE 300 U/ML
INJECTION, SOLUTION SUBCUTANEOUS
Qty: 4.5 ML | Refills: 1 | Status: SHIPPED | OUTPATIENT
Start: 2024-09-23

## 2024-09-25 DIAGNOSIS — I10 BENIGN ESSENTIAL HYPERTENSION: ICD-10-CM

## 2024-09-30 RX ORDER — AMLODIPINE BESYLATE 10 MG/1
10 TABLET ORAL DAILY
Qty: 90 TABLET | Refills: 0 | Status: SHIPPED | OUTPATIENT
Start: 2024-09-30

## 2024-10-05 ASSESSMENT — ENCOUNTER SYMPTOMS
PND: 0
HEADACHES: 0
HYPERTENSION: 1
PALPITATIONS: 0
SHORTNESS OF BREATH: 0
ORTHOPNEA: 0
BLURRED VISION: 0
SWEATS: 0
NECK PAIN: 0

## 2024-10-07 ENCOUNTER — APPOINTMENT (OUTPATIENT)
Dept: PRIMARY CARE | Facility: CLINIC | Age: 74
End: 2024-10-07
Payer: COMMERCIAL

## 2024-10-07 VITALS
DIASTOLIC BLOOD PRESSURE: 70 MMHG | HEIGHT: 67 IN | HEART RATE: 57 BPM | BODY MASS INDEX: 27.31 KG/M2 | OXYGEN SATURATION: 96 % | WEIGHT: 174 LBS | SYSTOLIC BLOOD PRESSURE: 160 MMHG

## 2024-10-07 DIAGNOSIS — E11.9 TYPE 2 DIABETES MELLITUS WITHOUT COMPLICATION, WITHOUT LONG-TERM CURRENT USE OF INSULIN (MULTI): Primary | ICD-10-CM

## 2024-10-07 DIAGNOSIS — I10 BENIGN ESSENTIAL HYPERTENSION: ICD-10-CM

## 2024-10-07 PROCEDURE — 3008F BODY MASS INDEX DOCD: CPT | Performed by: STUDENT IN AN ORGANIZED HEALTH CARE EDUCATION/TRAINING PROGRAM

## 2024-10-07 PROCEDURE — 3044F HG A1C LEVEL LT 7.0%: CPT | Performed by: STUDENT IN AN ORGANIZED HEALTH CARE EDUCATION/TRAINING PROGRAM

## 2024-10-07 PROCEDURE — 3078F DIAST BP <80 MM HG: CPT | Performed by: STUDENT IN AN ORGANIZED HEALTH CARE EDUCATION/TRAINING PROGRAM

## 2024-10-07 PROCEDURE — 3062F POS MACROALBUMINURIA REV: CPT | Performed by: STUDENT IN AN ORGANIZED HEALTH CARE EDUCATION/TRAINING PROGRAM

## 2024-10-07 PROCEDURE — 1160F RVW MEDS BY RX/DR IN RCRD: CPT | Performed by: STUDENT IN AN ORGANIZED HEALTH CARE EDUCATION/TRAINING PROGRAM

## 2024-10-07 PROCEDURE — 1036F TOBACCO NON-USER: CPT | Performed by: STUDENT IN AN ORGANIZED HEALTH CARE EDUCATION/TRAINING PROGRAM

## 2024-10-07 PROCEDURE — 99214 OFFICE O/P EST MOD 30 MIN: CPT | Performed by: STUDENT IN AN ORGANIZED HEALTH CARE EDUCATION/TRAINING PROGRAM

## 2024-10-07 PROCEDURE — 3050F LDL-C >= 130 MG/DL: CPT | Performed by: STUDENT IN AN ORGANIZED HEALTH CARE EDUCATION/TRAINING PROGRAM

## 2024-10-07 PROCEDURE — 3077F SYST BP >= 140 MM HG: CPT | Performed by: STUDENT IN AN ORGANIZED HEALTH CARE EDUCATION/TRAINING PROGRAM

## 2024-10-07 PROCEDURE — 1159F MED LIST DOCD IN RCRD: CPT | Performed by: STUDENT IN AN ORGANIZED HEALTH CARE EDUCATION/TRAINING PROGRAM

## 2024-10-07 PROCEDURE — 4010F ACE/ARB THERAPY RXD/TAKEN: CPT | Performed by: STUDENT IN AN ORGANIZED HEALTH CARE EDUCATION/TRAINING PROGRAM

## 2024-10-07 RX ORDER — HYDROCHLOROTHIAZIDE 25 MG/1
25 TABLET ORAL DAILY
Qty: 90 TABLET | Refills: 1 | Status: SHIPPED | OUTPATIENT
Start: 2024-10-07 | End: 2025-04-05

## 2024-10-07 ASSESSMENT — PATIENT HEALTH QUESTIONNAIRE - PHQ9
SUM OF ALL RESPONSES TO PHQ9 QUESTIONS 1 AND 2: 0
2. FEELING DOWN, DEPRESSED OR HOPELESS: NOT AT ALL
1. LITTLE INTEREST OR PLEASURE IN DOING THINGS: NOT AT ALL

## 2024-10-07 ASSESSMENT — ENCOUNTER SYMPTOMS
PSYCHIATRIC NEGATIVE: 1
PND: 0
CARDIOVASCULAR NEGATIVE: 1
GASTROINTESTINAL NEGATIVE: 1
BLURRED VISION: 0
NECK PAIN: 0
MUSCULOSKELETAL NEGATIVE: 1
NEUROLOGICAL NEGATIVE: 1
SHORTNESS OF BREATH: 0
SWEATS: 0
ORTHOPNEA: 0
RESPIRATORY NEGATIVE: 1
PALPITATIONS: 0
HEADACHES: 0
HYPERTENSION: 1

## 2024-10-07 ASSESSMENT — COLUMBIA-SUICIDE SEVERITY RATING SCALE - C-SSRS
1. IN THE PAST MONTH, HAVE YOU WISHED YOU WERE DEAD OR WISHED YOU COULD GO TO SLEEP AND NOT WAKE UP?: NO
2. HAVE YOU ACTUALLY HAD ANY THOUGHTS OF KILLING YOURSELF?: NO
6. HAVE YOU EVER DONE ANYTHING, STARTED TO DO ANYTHING, OR PREPARED TO DO ANYTHING TO END YOUR LIFE?: NO

## 2024-10-07 NOTE — PROGRESS NOTES
"Subjective   Patient ID: Harsha Herring is a 74 y.o. male.    Patient seen on follow-up.  Regards he is overall doing well, however, blood pressure has been somewhat elevated.  States that is typically ranging in the systolics in the 150s.  Otherwise, regards blood sugars have been well-controlled.  Denies any issues or side effects with his medications.  Denies any additional issues.    Hypertension  This is a chronic problem. The current episode started more than 1 year ago. The problem is unchanged. The problem is controlled. Associated symptoms include malaise/fatigue and peripheral edema. Pertinent negatives include no anxiety, blurred vision, chest pain, headaches, neck pain, orthopnea, palpitations, PND, shortness of breath or sweats. There are no associated agents to hypertension. Risk factors for coronary artery disease include diabetes mellitus, dyslipidemia and sedentary lifestyle. Compliance problems include exercise.        Review of Systems   Constitutional:  Positive for malaise/fatigue.   HENT: Negative.     Eyes:  Negative for blurred vision.   Respiratory: Negative.  Negative for shortness of breath.    Cardiovascular: Negative.  Negative for chest pain, palpitations, orthopnea and PND.   Gastrointestinal: Negative.    Musculoskeletal: Negative.  Negative for neck pain.   Skin: Negative.    Neurological: Negative.  Negative for headaches.   Psychiatric/Behavioral: Negative.         Objective Vitals Reviewed via facility EMR   Visit Vitals  /70   Pulse 57   Ht 1.702 m (5' 7\")   Wt 78.9 kg (174 lb)   SpO2 96%   BMI 27.25 kg/m²   Smoking Status Never   BSA 1.93 m²      Physical Exam  Constitutional:       General: He is not in acute distress.     Appearance: He is not ill-appearing.   Eyes:      Pupils: Pupils are equal, round, and reactive to light.   Cardiovascular:      Rate and Rhythm: Normal rate and regular rhythm.      Pulses: Normal pulses.      Heart sounds: No murmur " heard.  Pulmonary:      Effort: No respiratory distress.      Breath sounds: No wheezing.   Abdominal:      General: Abdomen is flat. Bowel sounds are normal. There is no distension.   Musculoskeletal:      Right lower leg: No edema.      Left lower leg: No edema.   Skin:     General: Skin is warm and dry.   Neurological:      Mental Status: He is alert. Mental status is at baseline.      Cranial Nerves: No cranial nerve deficit.      Motor: No weakness.   Psychiatric:         Mood and Affect: Mood normal.         Behavior: Behavior normal.         Assessment/Plan   Diagnoses and all orders for this visit:  Type 2 diabetes mellitus without complication, without long-term current use of insulin (Multi)  -     CBC; Future  -     Comprehensive Metabolic Panel; Future  -     Hemoglobin A1C; Future  Benign essential hypertension  -     hydroCHLOROthiazide (HYDRODiuril) 25 mg tablet; Take 1 tablet (25 mg) by mouth once daily.      #HTN  -Continue nebivolol and lisinopril amlodipine  -Continues to be elevated, add on hydrochlorothiazide discussed side effect profile     #HLD  -Continue atorvastatin     #T2DM  -Last A1c 6.3 (2024). Continue toujeo 10 units daily, glipizide, metformin.   -Recommend annual eye and foot exam.  -Last urine albumin creatinine ratio 1,566 (2024) recheck labs today     #History of CVA   #Carotid stenosis s/p right   -Continue aspirin, statin     #Health maintenance  -PSA normal 10/2023  -Reports negative cologuard .   -Up to date on pneumonia vaccine     RTC in 3-6 months

## 2024-10-15 DIAGNOSIS — I10 BENIGN ESSENTIAL HYPERTENSION: ICD-10-CM

## 2024-10-16 RX ORDER — NEBIVOLOL 20 MG/1
20 TABLET ORAL DAILY
Qty: 90 TABLET | Refills: 1 | Status: SHIPPED | OUTPATIENT
Start: 2024-10-16

## 2024-10-17 ENCOUNTER — HOSPITAL ENCOUNTER (OUTPATIENT)
Dept: VASCULAR MEDICINE | Facility: CLINIC | Age: 74
Discharge: HOME | End: 2024-10-17
Payer: COMMERCIAL

## 2024-10-17 DIAGNOSIS — I65.23 OCCLUSION AND STENOSIS OF BILATERAL CAROTID ARTERIES: ICD-10-CM

## 2024-10-17 DIAGNOSIS — I65.21 STENOSIS OF RIGHT CAROTID ARTERY: ICD-10-CM

## 2024-10-17 PROCEDURE — 93880 EXTRACRANIAL BILAT STUDY: CPT

## 2024-10-17 PROCEDURE — 93880 EXTRACRANIAL BILAT STUDY: CPT | Performed by: SURGERY

## 2024-10-22 DIAGNOSIS — E11.9 TYPE 2 DIABETES MELLITUS WITHOUT COMPLICATION, WITHOUT LONG-TERM CURRENT USE OF INSULIN (MULTI): ICD-10-CM

## 2024-10-22 RX ORDER — ATORVASTATIN CALCIUM 80 MG/1
80 TABLET, FILM COATED ORAL DAILY
Qty: 90 TABLET | Refills: 1 | Status: SHIPPED | OUTPATIENT
Start: 2024-10-22

## 2024-10-24 ENCOUNTER — APPOINTMENT (OUTPATIENT)
Dept: VASCULAR SURGERY | Facility: CLINIC | Age: 74
End: 2024-10-24
Payer: COMMERCIAL

## 2024-10-24 VITALS
SYSTOLIC BLOOD PRESSURE: 140 MMHG | BODY MASS INDEX: 27.31 KG/M2 | WEIGHT: 174 LBS | OXYGEN SATURATION: 99 % | DIASTOLIC BLOOD PRESSURE: 60 MMHG | HEIGHT: 67 IN | HEART RATE: 52 BPM

## 2024-10-24 DIAGNOSIS — I65.21 STENOSIS OF RIGHT CAROTID ARTERY: Primary | ICD-10-CM

## 2024-10-24 DIAGNOSIS — Z98.890 S/P CAROTID ENDARTERECTOMY: ICD-10-CM

## 2024-10-24 PROCEDURE — 1160F RVW MEDS BY RX/DR IN RCRD: CPT | Performed by: SURGERY

## 2024-10-24 PROCEDURE — 3050F LDL-C >= 130 MG/DL: CPT | Performed by: SURGERY

## 2024-10-24 PROCEDURE — 1159F MED LIST DOCD IN RCRD: CPT | Performed by: SURGERY

## 2024-10-24 PROCEDURE — 3062F POS MACROALBUMINURIA REV: CPT | Performed by: SURGERY

## 2024-10-24 PROCEDURE — 3077F SYST BP >= 140 MM HG: CPT | Performed by: SURGERY

## 2024-10-24 PROCEDURE — 3078F DIAST BP <80 MM HG: CPT | Performed by: SURGERY

## 2024-10-24 PROCEDURE — 4010F ACE/ARB THERAPY RXD/TAKEN: CPT | Performed by: SURGERY

## 2024-10-24 PROCEDURE — 3044F HG A1C LEVEL LT 7.0%: CPT | Performed by: SURGERY

## 2024-10-24 PROCEDURE — 3008F BODY MASS INDEX DOCD: CPT | Performed by: SURGERY

## 2024-10-24 PROCEDURE — 99213 OFFICE O/P EST LOW 20 MIN: CPT | Performed by: SURGERY

## 2024-10-27 DIAGNOSIS — I10 BENIGN ESSENTIAL HYPERTENSION: ICD-10-CM

## 2024-10-28 RX ORDER — AMLODIPINE BESYLATE 10 MG/1
10 TABLET ORAL DAILY
Qty: 90 TABLET | Refills: 1 | Status: SHIPPED | OUTPATIENT
Start: 2024-10-28

## 2024-10-28 NOTE — PROGRESS NOTES
"Harsha Herring is a 74 y.o. male     Subjective   This patient presents today for follow-up of his carotid artery disease.  He currently denies any constitutional symptoms associated with his carotid artery disease.  He is 74 years old.  I did do a right carotid endarterectomy on him about 6 months ago.  He is 5 foot 7 and weighs 174 pounds.  He has never smoked.  However, he is a diabetic.  He denies any fever chills nausea vomiting or headache         Objective     Vitals:    10/24/24 0900   BP: 140/60   Pulse: 52   SpO2: 99%      Physical Exam  This patient is alert and oriented x3.  No acute distress.  Head is normocephalic.  Pupils are equal and reactive to light and accommodation.  Neck is soft and supple without palpable lymph nodes.  Heart is regular rate.  Lungs are clear.  Abdomen is soft with positive bowel sounds there is no pain on palpation.  Upper and lower extremities have adequate range of motion with palpable brachial radial femoral and popliteal pulses.  Skin turgor is adequate.  Psychologically the patient appears to be acting appropriately.  His right neck incision has healed very well.  Blood pressure 140/60, pulse 52, height 1.702 m (5' 7\"), weight 78.9 kg (174 lb), SpO2 99%.            Patient Active Problem List    Diagnosis Date Noted    S/P carotid endarterectomy 04/17/2024    Stenosis of right carotid artery 07/24/2023    Chronic pain of both shoulders 07/24/2023    Benign essential hypertension 07/24/2023    CVA (cerebral vascular accident) (Multi) 07/24/2023    Gout 07/24/2023    Type 2 diabetes mellitus 07/24/2023    Pure hypercholesterolemia 07/24/2023    Diabetes mellitus screening 07/24/2023    Screening for prostate cancer 07/24/2023    Routine general medical examination at health care facility 07/24/2023          Current Outpatient Medications:     amLODIPine (Norvasc) 10 mg tablet, TAKE 1 TABLET ONCE DAILY, Disp: 90 tablet, Rfl: 0    aspirin 81 mg chewable tablet, Chew once " daily., Disp: , Rfl:     atorvastatin (Lipitor) 80 mg tablet, TAKE 1 TABLET ONCE DAILY, Disp: 90 tablet, Rfl: 1    glipiZIDE XL (Glucotrol XL) 5 mg 24 hr tablet, TAKE 1 TABLET DAILY, Disp: 90 tablet, Rfl: 0    hydroCHLOROthiazide (HYDRODiuril) 25 mg tablet, Take 1 tablet (25 mg) by mouth once daily., Disp: 90 tablet, Rfl: 1    lisinopril 40 mg tablet, TAKE 1 TABLET ONCE DAILY, Disp: 90 tablet, Rfl: 1    metFORMIN (Glucophage) 1,000 mg tablet, TAKE 1 TABLET TWICE A DAY, Disp: 180 tablet, Rfl: 0    nebivolol (Bystolic) 20 mg tablet, TAKE 1 TABLET ONCE DAILY, Disp: 90 tablet, Rfl: 1    OneTouch Verio test strips strip, USE AND DISCARD 1 TEST     STRIP DAILY, Disp: 100 strip, Rfl: 1    Toujeo SoloStar U-300 Insulin 300 unit/mL (1.5 mL) injection, INJECT 15 UNITS            SUBCUTANEOUSLY DAILY, Disp: 4.5 mL, Rfl: 1     Lab Results   Component Value Date    WBC 7.6 04/17/2024    HGB 12.3 (L) 04/17/2024    HCT 37.1 (L) 04/17/2024     04/17/2024    CHOL 278 (H) 02/06/2024    TRIG 282 (H) 02/06/2024    HDL 49.2 02/06/2024    ALT 21 05/07/2024    AST 17 05/07/2024     05/07/2024    K 4.8 05/07/2024    CL 99 05/07/2024    CREATININE 1.27 05/07/2024    BUN 24 (H) 05/07/2024    CO2 23 05/07/2024    PSA 0.61 07/10/2019    HGBA1C 6.5 (H) 05/07/2024       Vascular US carotid artery duplex bilateral    Result Date: 10/17/2024           [place] [id number] Tel [phone] and Fax [phone]  Vascular Lab Report VASC US CAROTID ARTERY DUPLEX BILATERAL  Patient Name:      LAI SANTOS     Reading Physician:  [id number] Jah Rangel DO Study Date:        10/17/2024          Ordering Physician: [id number] CASH SHELBY MRN/PID:           [id number]            Technologist:       Taylor Ken S Accession#:        [id number]        Technologist 2: Date of Birth/Age: 1950 / 74      Encounter#:         [id number]                     years Gender:            M Admission Status:  Outpatient          Location Performed: [facility]  Diagnosis/ICD: Occlusion and stenosis of bilateral carotid arteries-I65.23 CPT Codes:     29750 Cerebrovascular Carotid Duplex scan complete  Patient History Carotid surgery. RCEA.  CONCLUSIONS: Right Carotid: Findings are consistent with less than 50% stenosis of the right proximal internal carotid artery. Laminar flow seen by color Doppler. Right external carotid artery appears patent with no evidence of stenosis. The right vertebral artery is patent with antegrade flow. No evidence of hemodynamically significant stenosis in the right subclavian artery. Left Carotid: Findings are consistent with less than 50% stenosis of the left proximal internal carotid artery. Laminar flow seen by color Doppler. Left external carotid artery appears patent with no evidence of stenosis. The left vertebral artery demonstrates no flow. No evidence of hemodynamically significant stenosis in the left subclavian artery. Endarterectomy: Patent right carotid endarterectomy site following endarterectomy.  Comparison: Compared with study from 2/27/2024, patient underwent right carotid endarterectomy.  Imaging & Doppler Findings: Right Plaque Morph: The proximal right internal carotid artery demonstrates intimal thickening and heterogenous plaque. Left Plaque Morph: The proximal left internal carotid artery demonstrates heterogenous plaque. The left carotid bulb demonstrates calcified plaque.   Right                        Left   PSV      EDV                PSV      EDV 57 cm/s            CCA P    77 cm/s 51 cm/s            CCA D    67 cm/s 55 cm/s  9 cm/s    ICA P    56 cm/s  13 cm/s 50 cm/s  12 cm/s   ICA M    72 cm/s  16 cm/s 73 cm/s  16 cm/s   ICA D    94 cm/s  20 cm/s 74 cm/s             ECA     83 cm/s 68 cm/s  14 cm/s Vertebral 159 cm/s         Subclavian 173 /s               Right Left ICA/CCA Ratio  1.1  0.8   [id number]  Jah Rangel DO Electronically signed by [id number] Jah Rangel DO on 10/17/2024 at 3:55:59 PM  ** Final **                 Assessment/Plan   Active Problems:  There are no active Hospital Problems.      This patient presents today for follow-up of his carotid artery disease.  He currently denies any constitutional symptoms associated with his carotid artery disease.  I did do a right carotid endarterectomy on him about 6 months ago.  He has been a diabetic since 42 years old.  His right neck incision has healed very well.  His recent carotid duplex scan shows excellent results with mild disease bilaterally.  Overall, I am extremely pleased with his current results.  I would like him to follow-up with a carotid duplex scan in 6 months.  If that looks good, we can then go yearly      Spenser Moreno, DO

## 2024-11-27 DIAGNOSIS — I10 BENIGN ESSENTIAL HYPERTENSION: ICD-10-CM

## 2024-12-02 RX ORDER — METFORMIN HYDROCHLORIDE 1000 MG/1
1000 TABLET ORAL 2 TIMES DAILY
Qty: 180 TABLET | Refills: 0 | Status: SHIPPED | OUTPATIENT
Start: 2024-12-02

## 2024-12-06 DIAGNOSIS — E11.9 TYPE 2 DIABETES MELLITUS WITHOUT COMPLICATION, UNSPECIFIED WHETHER LONG TERM INSULIN USE (MULTI): ICD-10-CM

## 2024-12-06 RX ORDER — GLIPIZIDE 5 MG/1
TABLET, FILM COATED, EXTENDED RELEASE ORAL
Qty: 90 TABLET | Refills: 0 | Status: SHIPPED | OUTPATIENT
Start: 2024-12-06

## 2024-12-12 DIAGNOSIS — E11.9 TYPE 2 DIABETES MELLITUS WITHOUT COMPLICATION, UNSPECIFIED WHETHER LONG TERM INSULIN USE (MULTI): ICD-10-CM

## 2024-12-12 RX ORDER — BLOOD-GLUCOSE METER
1 EACH MISCELLANEOUS DAILY
Qty: 100 STRIP | Refills: 1 | Status: SHIPPED | OUTPATIENT
Start: 2024-12-12

## 2025-01-19 DIAGNOSIS — I10 BENIGN ESSENTIAL HYPERTENSION: ICD-10-CM

## 2025-01-20 RX ORDER — LISINOPRIL 40 MG/1
40 TABLET ORAL DAILY
Qty: 90 TABLET | Refills: 0 | Status: SHIPPED | OUTPATIENT
Start: 2025-01-20

## 2025-02-21 DIAGNOSIS — E11.9 TYPE 2 DIABETES MELLITUS WITHOUT COMPLICATION, UNSPECIFIED WHETHER LONG TERM INSULIN USE (MULTI): ICD-10-CM

## 2025-02-21 DIAGNOSIS — I10 BENIGN ESSENTIAL HYPERTENSION: ICD-10-CM

## 2025-02-21 RX ORDER — GLIPIZIDE 5 MG/1
TABLET, FILM COATED, EXTENDED RELEASE ORAL
Qty: 90 TABLET | Refills: 0 | Status: SHIPPED | OUTPATIENT
Start: 2025-02-21

## 2025-02-21 RX ORDER — METFORMIN HYDROCHLORIDE 1000 MG/1
1000 TABLET ORAL 2 TIMES DAILY
Qty: 180 TABLET | Refills: 0 | Status: SHIPPED | OUTPATIENT
Start: 2025-02-21

## 2025-03-10 ENCOUNTER — APPOINTMENT (OUTPATIENT)
Dept: PRIMARY CARE | Facility: CLINIC | Age: 75
End: 2025-03-10
Payer: COMMERCIAL

## 2025-03-10 VITALS
SYSTOLIC BLOOD PRESSURE: 128 MMHG | WEIGHT: 175 LBS | HEIGHT: 67 IN | DIASTOLIC BLOOD PRESSURE: 68 MMHG | BODY MASS INDEX: 27.47 KG/M2

## 2025-03-10 DIAGNOSIS — E08.22 DIABETES MELLITUS DUE TO UNDERLYING CONDITION WITH CHRONIC KIDNEY DISEASE, WITHOUT LONG-TERM CURRENT USE OF INSULIN, UNSPECIFIED CKD STAGE (MULTI): ICD-10-CM

## 2025-03-10 DIAGNOSIS — Z00.00 ROUTINE GENERAL MEDICAL EXAMINATION AT A HEALTH CARE FACILITY: ICD-10-CM

## 2025-03-10 DIAGNOSIS — Z00.00 ROUTINE GENERAL MEDICAL EXAMINATION AT HEALTH CARE FACILITY: Primary | ICD-10-CM

## 2025-03-10 DIAGNOSIS — E11.9 TYPE 2 DIABETES MELLITUS WITHOUT COMPLICATION, WITHOUT LONG-TERM CURRENT USE OF INSULIN (MULTI): ICD-10-CM

## 2025-03-10 DIAGNOSIS — I10 BENIGN ESSENTIAL HYPERTENSION: ICD-10-CM

## 2025-03-10 DIAGNOSIS — Z13.89 SCREENING FOR MULTIPLE CONDITIONS: ICD-10-CM

## 2025-03-10 DIAGNOSIS — Z13.220 SCREENING FOR HYPERLIPIDEMIA: ICD-10-CM

## 2025-03-10 DIAGNOSIS — E11.9 TYPE 2 DIABETES MELLITUS WITHOUT COMPLICATION, UNSPECIFIED WHETHER LONG TERM INSULIN USE (MULTI): ICD-10-CM

## 2025-03-10 DIAGNOSIS — Z13.1 SCREENING FOR DIABETES MELLITUS: ICD-10-CM

## 2025-03-10 DIAGNOSIS — Z71.89 CARDIAC RISK COUNSELING: ICD-10-CM

## 2025-03-10 DIAGNOSIS — Z13.1 DIABETES MELLITUS SCREENING: ICD-10-CM

## 2025-03-10 LAB
ALBUMIN SERPL-MCNC: 4.7 G/DL (ref 3.6–5.1)
ALP SERPL-CCNC: 66 U/L (ref 35–144)
ALT SERPL-CCNC: 14 U/L (ref 9–46)
ANION GAP SERPL CALCULATED.4IONS-SCNC: 11 MMOL/L (CALC) (ref 7–17)
AST SERPL-CCNC: 12 U/L (ref 10–35)
BILIRUB SERPL-MCNC: 0.5 MG/DL (ref 0.2–1.2)
BUN SERPL-MCNC: 32 MG/DL (ref 7–25)
CALCIUM SERPL-MCNC: 10.1 MG/DL (ref 8.6–10.3)
CHLORIDE SERPL-SCNC: 100 MMOL/L (ref 98–110)
CHOLEST SERPL-MCNC: 176 MG/DL
CHOLEST/HDLC SERPL: 3.6 (CALC)
CO2 SERPL-SCNC: 25 MMOL/L (ref 20–32)
CREAT SERPL-MCNC: 1.38 MG/DL (ref 0.7–1.28)
EGFRCR SERPLBLD CKD-EPI 2021: 54 ML/MIN/1.73M2
EST. AVERAGE GLUCOSE BLD GHB EST-MCNC: 126 MG/DL
EST. AVERAGE GLUCOSE BLD GHB EST-SCNC: 7 MMOL/L
GLUCOSE SERPL-MCNC: 145 MG/DL (ref 65–99)
HBA1C MFR BLD: 6 % OF TOTAL HGB
HDLC SERPL-MCNC: 49 MG/DL
LDLC SERPL CALC-MCNC: 95 MG/DL (CALC)
NONHDLC SERPL-MCNC: 127 MG/DL (CALC)
POTASSIUM SERPL-SCNC: 5.2 MMOL/L (ref 3.5–5.3)
PROT SERPL-MCNC: 7 G/DL (ref 6.1–8.1)
SODIUM SERPL-SCNC: 136 MMOL/L (ref 135–146)
TRIGL SERPL-MCNC: 228 MG/DL

## 2025-03-10 PROCEDURE — 99214 OFFICE O/P EST MOD 30 MIN: CPT | Performed by: STUDENT IN AN ORGANIZED HEALTH CARE EDUCATION/TRAINING PROGRAM

## 2025-03-10 PROCEDURE — G0444 DEPRESSION SCREEN ANNUAL: HCPCS | Performed by: STUDENT IN AN ORGANIZED HEALTH CARE EDUCATION/TRAINING PROGRAM

## 2025-03-10 PROCEDURE — 1036F TOBACCO NON-USER: CPT | Performed by: STUDENT IN AN ORGANIZED HEALTH CARE EDUCATION/TRAINING PROGRAM

## 2025-03-10 PROCEDURE — 1160F RVW MEDS BY RX/DR IN RCRD: CPT | Performed by: STUDENT IN AN ORGANIZED HEALTH CARE EDUCATION/TRAINING PROGRAM

## 2025-03-10 PROCEDURE — G0446 INTENS BEHAVE THER CARDIO DX: HCPCS | Performed by: STUDENT IN AN ORGANIZED HEALTH CARE EDUCATION/TRAINING PROGRAM

## 2025-03-10 PROCEDURE — 3078F DIAST BP <80 MM HG: CPT | Performed by: STUDENT IN AN ORGANIZED HEALTH CARE EDUCATION/TRAINING PROGRAM

## 2025-03-10 PROCEDURE — 1170F FXNL STATUS ASSESSED: CPT | Performed by: STUDENT IN AN ORGANIZED HEALTH CARE EDUCATION/TRAINING PROGRAM

## 2025-03-10 PROCEDURE — G0439 PPPS, SUBSEQ VISIT: HCPCS | Performed by: STUDENT IN AN ORGANIZED HEALTH CARE EDUCATION/TRAINING PROGRAM

## 2025-03-10 PROCEDURE — 3008F BODY MASS INDEX DOCD: CPT | Performed by: STUDENT IN AN ORGANIZED HEALTH CARE EDUCATION/TRAINING PROGRAM

## 2025-03-10 PROCEDURE — 3074F SYST BP LT 130 MM HG: CPT | Performed by: STUDENT IN AN ORGANIZED HEALTH CARE EDUCATION/TRAINING PROGRAM

## 2025-03-10 PROCEDURE — 1159F MED LIST DOCD IN RCRD: CPT | Performed by: STUDENT IN AN ORGANIZED HEALTH CARE EDUCATION/TRAINING PROGRAM

## 2025-03-10 PROCEDURE — 4010F ACE/ARB THERAPY RXD/TAKEN: CPT | Performed by: STUDENT IN AN ORGANIZED HEALTH CARE EDUCATION/TRAINING PROGRAM

## 2025-03-10 RX ORDER — NEBIVOLOL 20 MG/1
20 TABLET ORAL DAILY
Qty: 90 TABLET | Refills: 1 | Status: SHIPPED | OUTPATIENT
Start: 2025-03-10

## 2025-03-10 RX ORDER — METFORMIN HYDROCHLORIDE 1000 MG/1
1000 TABLET ORAL 2 TIMES DAILY
Qty: 180 TABLET | Refills: 1 | Status: SHIPPED | OUTPATIENT
Start: 2025-03-10

## 2025-03-10 RX ORDER — GLIPIZIDE 5 MG/1
TABLET, FILM COATED, EXTENDED RELEASE ORAL
Qty: 90 TABLET | Refills: 0 | Status: SHIPPED | OUTPATIENT
Start: 2025-03-10

## 2025-03-10 RX ORDER — AMLODIPINE BESYLATE 10 MG/1
10 TABLET ORAL DAILY
Qty: 90 TABLET | Refills: 1 | Status: SHIPPED | OUTPATIENT
Start: 2025-03-10

## 2025-03-10 RX ORDER — HYDROCHLOROTHIAZIDE 25 MG/1
25 TABLET ORAL DAILY
Qty: 90 TABLET | Refills: 1 | Status: SHIPPED | OUTPATIENT
Start: 2025-03-10 | End: 2025-09-06

## 2025-03-10 RX ORDER — ATORVASTATIN CALCIUM 80 MG/1
80 TABLET, FILM COATED ORAL DAILY
Qty: 90 TABLET | Refills: 1 | Status: SHIPPED | OUTPATIENT
Start: 2025-03-10

## 2025-03-10 ASSESSMENT — ACTIVITIES OF DAILY LIVING (ADL)
DOING_HOUSEWORK: INDEPENDENT
TAKING_MEDICATION: INDEPENDENT
DRESSING: INDEPENDENT
GROCERY_SHOPPING: INDEPENDENT
MANAGING_FINANCES: INDEPENDENT
BATHING: INDEPENDENT

## 2025-03-10 ASSESSMENT — ENCOUNTER SYMPTOMS
RESPIRATORY NEGATIVE: 1
MUSCULOSKELETAL NEGATIVE: 1
PSYCHIATRIC NEGATIVE: 1
OCCASIONAL FEELINGS OF UNSTEADINESS: 0
CONSTITUTIONAL NEGATIVE: 1
CARDIOVASCULAR NEGATIVE: 1
DEPRESSION: 0
GASTROINTESTINAL NEGATIVE: 1
LOSS OF SENSATION IN FEET: 0
NEUROLOGICAL NEGATIVE: 1

## 2025-03-10 NOTE — PROGRESS NOTES
Subjective   Patient ID: Harsha Herring is a 74 y.o. male who presents for Medicare Annual Wellness Visit Subsequent.    Patient seen on follow-up.  Regards that he is overall doing well, tolerating his medications.  States no low blood sugars or hyperglycemia episodes.  Regards that he has slightly more lethargic than usual and tired than usual otherwise, states no additional issues or concerns at this time and feels overall well    Diabetes  He has type 2 diabetes mellitus. No MedicAlert identification noted. The initial diagnosis of diabetes was made 32 years ago. Symptoms are stable. Current diabetic treatment includes insulin injections and oral agent (dual therapy). He is compliant with treatment all of the time. He is currently taking insulin pre-dinner. Insulin injections are given by patient. Rotation sites for injection include the abdominal wall. His weight is stable. He is following a generally healthy diet. When asked about meal planning, he reported none. He has not had a previous visit with a dietitian. He never participates in exercise. He monitors blood glucose at home 1-2 x per week. Blood glucose monitoring compliance is good. There is no change in his home blood glucose trend. His breakfast blood glucose is taken between 9-10 am. His breakfast blood glucose range is generally 130-140 mg/dl. His lunch blood glucose is taken between 12-1 pm. His lunch blood glucose range is generally 110-130 mg/dl. His dinner blood glucose is taken between 6-7 pm. His dinner blood glucose range is generally 110-130 mg/dl. His bedtime blood glucose is taken between 10-11 pm. His bedtime blood glucose range is generally 110-130 mg/dl. He does not see a podiatrist.Eye exam is not current.        Review of Systems   Constitutional: Negative.    HENT: Negative.     Respiratory: Negative.     Cardiovascular: Negative.    Gastrointestinal: Negative.    Musculoskeletal: Negative.    Skin: Negative.    Neurological:  "Negative.    Psychiatric/Behavioral: Negative.         Objective   /68   Ht 1.702 m (5' 7\")   Wt 79.4 kg (175 lb)   BMI 27.41 kg/m²     Physical Exam  Constitutional:       General: He is not in acute distress.     Appearance: He is not ill-appearing.   Eyes:      Pupils: Pupils are equal, round, and reactive to light.   Cardiovascular:      Rate and Rhythm: Normal rate and regular rhythm.      Pulses: Normal pulses.      Heart sounds: No murmur heard.  Pulmonary:      Effort: No respiratory distress.      Breath sounds: No wheezing.   Abdominal:      General: Abdomen is flat. Bowel sounds are normal. There is no distension.   Musculoskeletal:      Right lower leg: No edema.      Left lower leg: No edema.   Skin:     General: Skin is warm and dry.   Neurological:      Mental Status: He is alert. Mental status is at baseline.      Cranial Nerves: No cranial nerve deficit.      Comments: Fatigue   Psychiatric:         Mood and Affect: Mood normal.         Behavior: Behavior normal.         Assessment/Plan   Problem List Items Addressed This Visit             ICD-10-CM    Benign essential hypertension I10    Relevant Medications    metFORMIN (Glucophage) 1,000 mg tablet    amLODIPine (Norvasc) 10 mg tablet    hydroCHLOROthiazide (HYDRODiuril) 25 mg tablet    nebivolol (Bystolic) 20 mg tablet    Type 2 diabetes mellitus E11.9    Relevant Medications    glipiZIDE XL (Glucotrol XL) 5 mg 24 hr tablet    atorvastatin (Lipitor) 80 mg tablet    Other Relevant Orders    Hemoglobin A1C - Lab collect    Albumin-Creatinine Ratio, Urine Random    Diabetes mellitus screening Z13.1    Routine general medical examination at health care facility - Primary Z00.00    Relevant Orders    1 Year Follow Up In Primary Care - Wellness Exam    Comprehensive Metabolic Panel    Diabetes mellitus due to underlying condition with chronic kidney disease, without long-term current use of insulin, unspecified CKD stage (Multi) E08.22    " Relevant Orders    Hemoglobin A1C - Lab collect     Other Visit Diagnoses         Codes    Screening for multiple conditions     Z13.89    Cardiac risk counseling     Z71.89    Routine general medical examination at a health care facility     Z00.00    Screening for diabetes mellitus     Z13.1    Screening for hyperlipidemia     Z13.220    Relevant Orders    Lipid Panel          Patient seen on follow-up and Medicare wellness    #HTN  -Continue nebivolol and lisinopril amlodipine  -Continues to be elevated, add on hydrochlorothiazide he reports much improvement with that check CMP today, closely monitor electrolytes discussed side effect profile     #HLD  -Continue atorvastatin discussed cardiac risk factors     #T2DM  -Last A1c 6.3 (2024). Continue toujeo 10 units daily, glipizide, metformin.  Check A1c, continue on current medications, is following with ophthalmology and podiatry  -Recommend annual eye and foot exam.  -Last urine albumin creatinine ratio 1,566 (2024) recheck labs today     #History of CVA   #Carotid stenosis s/p right   -Continue aspirin, statin     #Fatigue  Could be multifactorial, depression screen negative, continue to closely monitor lab work today    #Health maintenance  -PSA normal 10/2023  -Reports negative cologuard .   -Up to date on pneumonia vaccine Medicare wellness today     RTC in 3-6 months

## 2025-03-28 DIAGNOSIS — E11.9 TYPE 2 DIABETES MELLITUS WITHOUT COMPLICATION, WITHOUT LONG-TERM CURRENT USE OF INSULIN: ICD-10-CM

## 2025-03-28 DIAGNOSIS — I10 BENIGN ESSENTIAL HYPERTENSION: ICD-10-CM

## 2025-03-28 RX ORDER — INSULIN GLARGINE 300 U/ML
INJECTION, SOLUTION SUBCUTANEOUS
Qty: 4.5 ML | Refills: 1 | Status: SHIPPED | OUTPATIENT
Start: 2025-03-28

## 2025-03-31 DIAGNOSIS — E11.9 TYPE 2 DIABETES MELLITUS WITHOUT COMPLICATION, WITHOUT LONG-TERM CURRENT USE OF INSULIN: ICD-10-CM

## 2025-03-31 DIAGNOSIS — I10 BENIGN ESSENTIAL HYPERTENSION: ICD-10-CM

## 2025-03-31 RX ORDER — INSULIN GLARGINE 300 U/ML
INJECTION, SOLUTION SUBCUTANEOUS
Qty: 4.5 ML | Refills: 1 | Status: SHIPPED | OUTPATIENT
Start: 2025-03-31

## 2025-04-21 ENCOUNTER — HOSPITAL ENCOUNTER (OUTPATIENT)
Dept: VASCULAR MEDICINE | Facility: CLINIC | Age: 75
Discharge: HOME | End: 2025-04-21
Payer: MEDICARE

## 2025-04-21 DIAGNOSIS — I65.21 STENOSIS OF RIGHT CAROTID ARTERY: ICD-10-CM

## 2025-04-21 DIAGNOSIS — I65.23 OCCLUSION AND STENOSIS OF BILATERAL CAROTID ARTERIES: ICD-10-CM

## 2025-04-21 DIAGNOSIS — Z98.890 S/P CAROTID ENDARTERECTOMY: ICD-10-CM

## 2025-04-21 PROCEDURE — 93880 EXTRACRANIAL BILAT STUDY: CPT

## 2025-04-21 PROCEDURE — 93880 EXTRACRANIAL BILAT STUDY: CPT | Performed by: STUDENT IN AN ORGANIZED HEALTH CARE EDUCATION/TRAINING PROGRAM

## 2025-05-01 ENCOUNTER — APPOINTMENT (OUTPATIENT)
Dept: VASCULAR SURGERY | Facility: CLINIC | Age: 75
End: 2025-05-01
Payer: COMMERCIAL

## 2025-05-01 VITALS
DIASTOLIC BLOOD PRESSURE: 70 MMHG | OXYGEN SATURATION: 96 % | SYSTOLIC BLOOD PRESSURE: 140 MMHG | HEIGHT: 67 IN | BODY MASS INDEX: 27.78 KG/M2 | HEART RATE: 55 BPM | WEIGHT: 177 LBS

## 2025-05-01 DIAGNOSIS — I65.21 STENOSIS OF RIGHT CAROTID ARTERY: ICD-10-CM

## 2025-05-01 DIAGNOSIS — Z98.890 S/P CAROTID ENDARTERECTOMY: ICD-10-CM

## 2025-05-01 PROCEDURE — 1036F TOBACCO NON-USER: CPT | Performed by: SURGERY

## 2025-05-01 PROCEDURE — 3008F BODY MASS INDEX DOCD: CPT | Performed by: SURGERY

## 2025-05-01 PROCEDURE — 4010F ACE/ARB THERAPY RXD/TAKEN: CPT | Performed by: SURGERY

## 2025-05-01 PROCEDURE — 3077F SYST BP >= 140 MM HG: CPT | Performed by: SURGERY

## 2025-05-01 PROCEDURE — 3078F DIAST BP <80 MM HG: CPT | Performed by: SURGERY

## 2025-05-01 PROCEDURE — 1160F RVW MEDS BY RX/DR IN RCRD: CPT | Performed by: SURGERY

## 2025-05-01 PROCEDURE — 1159F MED LIST DOCD IN RCRD: CPT | Performed by: SURGERY

## 2025-05-01 PROCEDURE — 99213 OFFICE O/P EST LOW 20 MIN: CPT | Performed by: SURGERY

## 2025-05-01 NOTE — PROGRESS NOTES
"Harsha Herring is a 74 y.o. male     Subjective   This patient presents today for follow-up of his carotid artery disease.  He currently denies any constitutional symptoms associated with his carotid artery disease.  I did do a right carotid endarterectomy on him  just over a year ago.  Prior to the surgery, he did have a significant right hemispheric stroke.  He is still functional.  He is a retired autobody worker.  He has never smoked.  Unfortunately, he is a diabetic.  He currently denies any fever chills nausea vomiting or headache.           Objective     Vitals:    05/01/25 0900   BP: 140/70   Pulse: 55   SpO2: 96%      Physical Exam  This patient is alert and oriented x 3.  He is in no acute distress.  Head is normocephalic.  Pupils are equal and reactive to light and accommodation.  Neck is soft and supple without palpable lymph nodes.  Heart is regular rate.  Lungs are clear.  Abdomen is soft with positive bowel sounds there is no pain on palpation.  Right upper and right lower extremities have adequate range of motion.  His left upper and left lower extremities have decreased range of motion.  He does have palpable peripheral pulses.  Psychologically he appears to be acting appropriately.  Blood pressure 140/70, pulse 55, height 1.702 m (5' 7\"), weight 80.3 kg (177 lb), SpO2 96%.            Patient Active Problem List    Diagnosis Date Noted    Diabetes mellitus due to underlying condition with chronic kidney disease, without long-term current use of insulin, unspecified CKD stage (Multi) 03/10/2025    S/P carotid endarterectomy 04/17/2024    Stenosis of right carotid artery 07/24/2023    Chronic pain of both shoulders 07/24/2023    Benign essential hypertension 07/24/2023    CVA (cerebral vascular accident) (Multi) 07/24/2023    Gout 07/24/2023    Type 2 diabetes mellitus 07/24/2023    Pure hypercholesterolemia 07/24/2023    Diabetes mellitus screening 07/24/2023    Screening for prostate cancer " 07/24/2023    Routine general medical examination at [facility] care facility 07/24/2023        Current Medications[1]     Lab Results   Component Value Date    WBC 7.6 04/17/2024    HGB 12.3 (L) 04/17/2024    HCT 37.1 (L) 04/17/2024     04/17/2024    CHOL 176 03/10/2025    TRIG 228 (H) 03/10/2025    HDL 49 03/10/2025    ALT 14 03/10/2025    AST 12 03/10/2025     03/10/2025    K 5.2 03/10/2025     03/10/2025    CREATININE 1.38 (H) 03/10/2025    BUN 32 (H) 03/10/2025    CO2 25 03/10/2025    PSA 0.61 07/10/2019    HGBA1C 6.0 (H) 03/10/2025       Vascular US carotid artery duplex bilateral  Result Date: 4/22/2025           [place] [address] Tel [phone] and Fax [phone]  Vascular Lab Report [facility] US CAROTID ARTERY DUPLEX BILATERAL  Patient Name:     LAI SANTOS     Reading           [id number] Jean Mejia                                       Physician:        MD Study Date:       4/21/2025           Ordering          [id number] CASH SHELBY                                       Physician: MRN/PID:          [id number]            Technologist:     Taylor BERNARDO Accession#:       [id number]        Technologist 2: Date of           1950 / 74      Encounter#:       [id number] Birth/Age:        years Gender:           M Admission Status: Outpatient          Location          [facility]                                       Performed:  Diagnosis/ICD: Occlusion and stenosis of bilateral carotid arteries-I65.23 CPT Codes:     29628 Cerebrovascular Carotid Duplex scan complete  Patient History Carotid surgery. RCEA.  CONCLUSIONS: Right Carotid: Findings are consistent with less than 50% stenosis of the right proximal internal carotid artery. Laminar flow seen by color Doppler. Right external carotid artery appears patent with no evidence of stenosis. The right vertebral artery is patent with antegrade flow. No evidence of hemodynamically  significant stenosis in the right subclavian artery. Left Carotid: Findings are consistent with less than 50% stenosis of the left proximal internal carotid artery. Laminar flow seen by color Doppler. Left external carotid artery appears patent with no evidence of stenosis. The left vertebral artery demonstrates no flow. There are elevated velocities in the left subclavian artery that are suggestive of disease.  Comparison: Compared with study from 10/17/2024, left subclavian artery velocities are higher.  Imaging & Doppler Findings: Right Plaque Morph: The proximal right internal carotid artery demonstrates intimal thickening plaque. Left Plaque Morph: The proximal left internal carotid artery demonstrates heterogenous plaque. The left carotid bulb demonstrates heterogenous plaque.   Right                        Left   PSV      EDV                PSV      EDV 59 cm/s            CCA P    86 cm/s 58 cm/s            CCA D    82 cm/s 54 cm/s  6 cm/s    ICA P    56 cm/s  9 cm/s 58 cm/s  8 cm/s    ICA M    53 cm/s  10 cm/s 47 cm/s  8 cm/s    ICA D    58 cm/s  14 cm/s 101 cm/s            ECA     110 cm/s 61 cm/s  10 cm/s Vertebral 154 cm/s         Subclavian 265 cm/s               Right Left ICA/CCA Ratio  0.9  0.7   [id number] Jean Mejia MD Electronically signed by [id number] Jean Mejia MD on 4/22/2025 at 7:00:26 PM  ** Final **                 Assessment/Plan   Assessment & Plan  S/P carotid endarterectomy    Stenosis of right carotid artery      This patient presents today for follow-up of his carotid artery disease.  I did do a right carotid endarterectomy on him just over a year ago.  Unfortunately, he did have a significant right hemispheric stroke prior to his carotid endarterectomy.  He still functions very well.  His carotid surgery went extremely well.  His recent carotid duplex scan shows normal velocities bilaterally.  Overall, I am extremely pleased with his current results.  He can follow-up with me in 1  year with a repeat carotid duplex scan      Spenser Moreno DO        [1]   Current Outpatient Medications:     amLODIPine (Norvasc) 10 mg tablet, Take 1 tablet (10 mg) by mouth once daily., Disp: 90 tablet, Rfl: 1    aspirin 81 mg chewable tablet, Chew once daily., Disp: , Rfl:     atorvastatin (Lipitor) 80 mg tablet, Take 1 tablet (80 mg) by mouth once daily., Disp: 90 tablet, Rfl: 1    glipiZIDE XL (Glucotrol XL) 5 mg 24 hr tablet, TAKE 1 TABLET DAILY, Disp: 90 tablet, Rfl: 0    hydroCHLOROthiazide (HYDRODiuril) 25 mg tablet, Take 1 tablet (25 mg) by mouth once daily., Disp: 90 tablet, Rfl: 1    lisinopril 40 mg tablet, TAKE 1 TABLET ONCE DAILY, Disp: 90 tablet, Rfl: 0    metFORMIN (Glucophage) 1,000 mg tablet, Take 1 tablet (1,000 mg) by mouth 2 times a day., Disp: 180 tablet, Rfl: 1    nebivolol (Bystolic) 20 mg tablet, Take 1 tablet (20 mg) by mouth once daily., Disp: 90 tablet, Rfl: 1    OneTouch Verio test strips strip, USE AND DISCARD 1 TEST     STRIP DAILY, Disp: 100 strip, Rfl: 1    Toujeo SoloStar U-300 Insulin 300 unit/mL (1.5 mL) pen, INJECT 15 UNITS SUBCUTANEOUSLY DAILY, Disp: 4.5 mL, Rfl: 1

## 2025-06-05 DIAGNOSIS — E11.9 TYPE 2 DIABETES MELLITUS WITHOUT COMPLICATION, UNSPECIFIED WHETHER LONG TERM INSULIN USE: ICD-10-CM

## 2025-06-05 RX ORDER — GLIPIZIDE 5 MG/1
5 TABLET, FILM COATED, EXTENDED RELEASE ORAL DAILY
Qty: 90 TABLET | Refills: 0 | Status: SHIPPED | OUTPATIENT
Start: 2025-06-05

## 2025-06-06 DIAGNOSIS — I10 BENIGN ESSENTIAL HYPERTENSION: ICD-10-CM

## 2025-06-06 RX ORDER — LISINOPRIL 40 MG/1
40 TABLET ORAL DAILY
Qty: 90 TABLET | Refills: 0 | Status: SHIPPED | OUTPATIENT
Start: 2025-06-06

## 2025-07-14 ENCOUNTER — APPOINTMENT (OUTPATIENT)
Dept: PRIMARY CARE | Facility: CLINIC | Age: 75
End: 2025-07-14
Payer: MEDICARE

## 2025-07-17 ENCOUNTER — APPOINTMENT (OUTPATIENT)
Dept: PRIMARY CARE | Facility: CLINIC | Age: 75
End: 2025-07-17
Payer: MEDICARE

## 2025-07-17 VITALS
DIASTOLIC BLOOD PRESSURE: 64 MMHG | HEIGHT: 67 IN | BODY MASS INDEX: 27.62 KG/M2 | SYSTOLIC BLOOD PRESSURE: 120 MMHG | WEIGHT: 176 LBS

## 2025-07-17 DIAGNOSIS — E11.9 TYPE 2 DIABETES MELLITUS WITHOUT COMPLICATION, UNSPECIFIED WHETHER LONG TERM INSULIN USE: ICD-10-CM

## 2025-07-17 DIAGNOSIS — I10 BENIGN ESSENTIAL HYPERTENSION: ICD-10-CM

## 2025-07-17 DIAGNOSIS — E11.9 TYPE 2 DIABETES MELLITUS WITHOUT COMPLICATION, WITHOUT LONG-TERM CURRENT USE OF INSULIN: Primary | ICD-10-CM

## 2025-07-17 PROCEDURE — 1036F TOBACCO NON-USER: CPT | Performed by: STUDENT IN AN ORGANIZED HEALTH CARE EDUCATION/TRAINING PROGRAM

## 2025-07-17 PROCEDURE — 4010F ACE/ARB THERAPY RXD/TAKEN: CPT | Performed by: STUDENT IN AN ORGANIZED HEALTH CARE EDUCATION/TRAINING PROGRAM

## 2025-07-17 PROCEDURE — 3008F BODY MASS INDEX DOCD: CPT | Performed by: STUDENT IN AN ORGANIZED HEALTH CARE EDUCATION/TRAINING PROGRAM

## 2025-07-17 PROCEDURE — 3078F DIAST BP <80 MM HG: CPT | Performed by: STUDENT IN AN ORGANIZED HEALTH CARE EDUCATION/TRAINING PROGRAM

## 2025-07-17 PROCEDURE — G2211 COMPLEX E/M VISIT ADD ON: HCPCS | Performed by: STUDENT IN AN ORGANIZED HEALTH CARE EDUCATION/TRAINING PROGRAM

## 2025-07-17 PROCEDURE — 1159F MED LIST DOCD IN RCRD: CPT | Performed by: STUDENT IN AN ORGANIZED HEALTH CARE EDUCATION/TRAINING PROGRAM

## 2025-07-17 PROCEDURE — 1160F RVW MEDS BY RX/DR IN RCRD: CPT | Performed by: STUDENT IN AN ORGANIZED HEALTH CARE EDUCATION/TRAINING PROGRAM

## 2025-07-17 PROCEDURE — 3074F SYST BP LT 130 MM HG: CPT | Performed by: STUDENT IN AN ORGANIZED HEALTH CARE EDUCATION/TRAINING PROGRAM

## 2025-07-17 PROCEDURE — 99213 OFFICE O/P EST LOW 20 MIN: CPT | Performed by: STUDENT IN AN ORGANIZED HEALTH CARE EDUCATION/TRAINING PROGRAM

## 2025-07-17 RX ORDER — NEBIVOLOL 20 MG/1
20 TABLET ORAL DAILY
Qty: 90 TABLET | Refills: 1 | Status: SHIPPED | OUTPATIENT
Start: 2025-07-17

## 2025-07-17 RX ORDER — HYDROCHLOROTHIAZIDE 25 MG/1
25 TABLET ORAL DAILY
Qty: 90 TABLET | Refills: 1 | Status: SHIPPED | OUTPATIENT
Start: 2025-07-17 | End: 2026-01-13

## 2025-07-17 RX ORDER — AMLODIPINE BESYLATE 10 MG/1
10 TABLET ORAL DAILY
Qty: 90 TABLET | Refills: 1 | Status: SHIPPED | OUTPATIENT
Start: 2025-07-17

## 2025-07-17 RX ORDER — GLIPIZIDE 5 MG/1
5 TABLET, FILM COATED, EXTENDED RELEASE ORAL DAILY
Qty: 90 TABLET | Refills: 0 | Status: SHIPPED | OUTPATIENT
Start: 2025-07-17

## 2025-07-17 RX ORDER — METFORMIN HYDROCHLORIDE 1000 MG/1
1000 TABLET ORAL 2 TIMES DAILY
Qty: 180 TABLET | Refills: 1 | Status: SHIPPED | OUTPATIENT
Start: 2025-07-17

## 2025-07-17 RX ORDER — INSULIN GLARGINE 300 U/ML
INJECTION, SOLUTION SUBCUTANEOUS
Qty: 4.5 ML | Refills: 1 | Status: SHIPPED | OUTPATIENT
Start: 2025-07-17

## 2025-07-17 ASSESSMENT — ENCOUNTER SYMPTOMS
PSYCHIATRIC NEGATIVE: 1
GASTROINTESTINAL NEGATIVE: 1
MUSCULOSKELETAL NEGATIVE: 1
RESPIRATORY NEGATIVE: 1
CONSTITUTIONAL NEGATIVE: 1
NEUROLOGICAL NEGATIVE: 1
CARDIOVASCULAR NEGATIVE: 1

## 2025-07-17 NOTE — PROGRESS NOTES
"Subjective   Patient ID: Harsha Herring is a 74 y.o. male who presents for Follow-up.    Pt seen on routine follow up. Regards that he is overall doing well. States no acute issues. Blood sugars are overall well controlled. Otherwise states no acute issues or concerns.          Review of Systems   Constitutional: Negative.    HENT: Negative.     Respiratory: Negative.     Cardiovascular: Negative.    Gastrointestinal: Negative.    Musculoskeletal: Negative.    Skin: Negative.    Neurological: Negative.    Psychiatric/Behavioral: Negative.         Objective   /64   Ht 1.702 m (5' 7\")   Wt 79.8 kg (176 lb)   BMI 27.57 kg/m²     Physical Exam  Constitutional:       General: He is not in acute distress.     Appearance: He is not ill-appearing.     Eyes:      Pupils: Pupils are equal, round, and reactive to light.       Cardiovascular:      Rate and Rhythm: Normal rate and regular rhythm.      Pulses: Normal pulses.      Heart sounds: No murmur heard.  Pulmonary:      Effort: No respiratory distress.      Breath sounds: No wheezing.   Abdominal:      General: Abdomen is flat. Bowel sounds are normal. There is no distension.     Musculoskeletal:      Right lower leg: No edema.      Left lower leg: No edema.     Skin:     General: Skin is warm and dry.     Neurological:      Mental Status: He is alert. Mental status is at baseline.      Cranial Nerves: No cranial nerve deficit.     Psychiatric:         Mood and Affect: Mood normal.         Behavior: Behavior normal.         Assessment/Plan   Problem List Items Addressed This Visit           ICD-10-CM    Benign essential hypertension I10    Relevant Medications    nebivolol (Bystolic) 20 mg tablet    metFORMIN (Glucophage) 1,000 mg tablet    hydroCHLOROthiazide (HYDRODiuril) 25 mg tablet    amLODIPine (Norvasc) 10 mg tablet    Toujeo SoloStar U-300 Insulin 300 unit/mL (1.5 mL) pen    Type 2 diabetes mellitus - Primary E11.9    Relevant Medications    metFORMIN " (Glucophage) 1,000 mg tablet    glipiZIDE XL (Glucotrol XL) 5 mg 24 hr tablet    Toujeo SoloStar U-300 Insulin 300 unit/mL (1.5 mL) pen    Other Relevant Orders    Basic metabolic panel    Hemoglobin A1C    Albumin-Creatinine Ratio, Urine Random          Patient seen on follow-up and Medicare wellness     #HTN  -Continue nebivolol and lisinopril amlodipine  -Continues to be elevated, add on hydrochlorothiazide he reports much improvement with that check CMP today, closely monitor electrolytes discussed side effect profile     #HLD  -Continue atorvastatin discussed cardiac risk factors     #T2DM  -Last A1c 6.3 (2024). Continue toujeo 15 units daily, glipizide, metformin.  Check A1c, continue on current medications, is following with ophthalmology and podiatry  -Recommend annual eye and foot exam.  -Last urine albumin creatinine ratio 1,566 (2024) recheck labs today recheck lab today, could gert rid of glipizide potentially     #History of CVA   #Carotid stenosis s/p right   -Continue aspirin, statin     #Fatigue  Could be multifactorial, depression screen negative, continue to closely monitor lab work today     #Health maintenance  -PSA normal 10/2023  -Reports negative cologuard .   -Up to date on pneumonia vaccine Medicare wellness today     RTC in 3-6 months

## 2025-08-27 DIAGNOSIS — I10 BENIGN ESSENTIAL HYPERTENSION: ICD-10-CM

## 2025-08-27 DIAGNOSIS — E11.9 TYPE 2 DIABETES MELLITUS WITHOUT COMPLICATION, WITHOUT LONG-TERM CURRENT USE OF INSULIN: ICD-10-CM

## 2025-08-27 RX ORDER — LISINOPRIL 40 MG/1
TABLET ORAL
Qty: 90 TABLET | Refills: 0 | Status: SHIPPED | OUTPATIENT
Start: 2025-08-27

## 2025-08-27 RX ORDER — ATORVASTATIN CALCIUM 80 MG/1
80 TABLET, FILM COATED ORAL DAILY
Qty: 90 TABLET | Refills: 1 | Status: SHIPPED | OUTPATIENT
Start: 2025-08-27

## 2025-11-18 ENCOUNTER — APPOINTMENT (OUTPATIENT)
Dept: PRIMARY CARE | Facility: CLINIC | Age: 75
End: 2025-11-18
Payer: MEDICARE

## 2026-04-30 ENCOUNTER — APPOINTMENT (OUTPATIENT)
Dept: VASCULAR SURGERY | Facility: CLINIC | Age: 76
End: 2026-04-30
Payer: MEDICARE

## (undated) DEVICE — Device

## (undated) DEVICE — APPLICATOR, CHLORAPREP, W/ORANGE TINT, 26ML

## (undated) DEVICE — CATHETER KIT, RADIAL ARTLINE, 20G X 1 3/4

## (undated) DEVICE — LOOP, VESSEL, MINI, BLUE STERILE

## (undated) DEVICE — APPLIER, LIGACLIP, MULTIPLE, SMALL 9-3/8IN

## (undated) DEVICE — SHUNT, CAROTID, ENDARTERECTOMY, EXTERNAL, 3 X 5 MM

## (undated) DEVICE — GEL, ULTRASOUND, AQUASONIC 100, 20 GM, STERILE

## (undated) DEVICE — DRESSING, TELFA, 3X4

## (undated) DEVICE — BAG, DECANTER

## (undated) DEVICE — NEEDLE, HYPODERMIC, SAFETYGLIDE, SHIELDING, REGULAR WALL, REGULAR BEVEL, 25 G X 5/8 IN, BLUE HUB

## (undated) DEVICE — SPONGE, HEMOSTATIC, GELATIN, SURGIFOAM, 8 X 12.5 CM X 10 MM

## (undated) DEVICE — SLEEVE, VASO PRESS, CALF GARMENT, MEDIUM, GREEN

## (undated) DEVICE — DRAPE, TRANSFER ZONE, MAGNETIC, SAF-T PASS, 16 X 20 IN, STERILE

## (undated) DEVICE — WOUND SYSTEM, DEBRIDEMENT & CLEANING, O.R DUOPAK

## (undated) DEVICE — DRESSING, TRANSPARENT, TEGADERM, 4 X 4-3/4 IN, NO LABEL